# Patient Record
Sex: MALE | Race: WHITE | NOT HISPANIC OR LATINO | Employment: FULL TIME | URBAN - METROPOLITAN AREA
[De-identification: names, ages, dates, MRNs, and addresses within clinical notes are randomized per-mention and may not be internally consistent; named-entity substitution may affect disease eponyms.]

---

## 2019-10-28 ENCOUNTER — APPOINTMENT (OUTPATIENT)
Dept: GENERAL RADIOLOGY | Facility: HOSPITAL | Age: 54
End: 2019-10-28

## 2019-10-28 ENCOUNTER — APPOINTMENT (OUTPATIENT)
Dept: CT IMAGING | Facility: HOSPITAL | Age: 54
End: 2019-10-28

## 2019-10-28 ENCOUNTER — HOSPITAL ENCOUNTER (OUTPATIENT)
Facility: HOSPITAL | Age: 54
Setting detail: OBSERVATION
Discharge: HOME | End: 2019-10-29
Attending: EMERGENCY MEDICINE | Admitting: INTERNAL MEDICINE

## 2019-10-28 DIAGNOSIS — I10 ACCELERATED HYPERTENSION: Primary | ICD-10-CM

## 2019-10-28 DIAGNOSIS — Z86.39 HISTORY OF DIABETES MELLITUS, TYPE II: ICD-10-CM

## 2019-10-28 DIAGNOSIS — E66.9 OBESITY, UNSPECIFIED CLASSIFICATION, UNSPECIFIED OBESITY TYPE, UNSPECIFIED WHETHER SERIOUS COMORBIDITY PRESENT: ICD-10-CM

## 2019-10-28 DIAGNOSIS — R07.89 ATYPICAL CHEST PAIN: ICD-10-CM

## 2019-10-28 LAB
ALBUMIN SERPL-MCNC: 4.6 G/DL (ref 3.5–5.2)
ALBUMIN/GLOB SERPL: 1.5 G/DL
ALP SERPL-CCNC: 70 U/L (ref 39–117)
ALT SERPL W P-5'-P-CCNC: 81 U/L (ref 1–41)
ANION GAP SERPL CALCULATED.3IONS-SCNC: 15 MMOL/L (ref 5–15)
AST SERPL-CCNC: 57 U/L (ref 1–40)
BASOPHILS # BLD AUTO: 0.05 10*3/MM3 (ref 0–0.2)
BASOPHILS NFR BLD AUTO: 0.8 % (ref 0–1.5)
BILIRUB SERPL-MCNC: 0.4 MG/DL (ref 0.2–1.2)
BUN BLD-MCNC: 12 MG/DL (ref 6–20)
BUN/CREAT SERPL: 15.6 (ref 7–25)
CALCIUM SPEC-SCNC: 9.9 MG/DL (ref 8.6–10.5)
CHLORIDE SERPL-SCNC: 99 MMOL/L (ref 98–107)
CO2 SERPL-SCNC: 24 MMOL/L (ref 22–29)
CREAT BLD-MCNC: 0.77 MG/DL (ref 0.76–1.27)
D DIMER PPP FEU-MCNC: 0.35 MCGFEU/ML (ref 0–0.56)
DEPRECATED RDW RBC AUTO: 44.4 FL (ref 37–54)
EOSINOPHIL # BLD AUTO: 0.2 10*3/MM3 (ref 0–0.4)
EOSINOPHIL NFR BLD AUTO: 3.1 % (ref 0.3–6.2)
ERYTHROCYTE [DISTWIDTH] IN BLOOD BY AUTOMATED COUNT: 13.7 % (ref 12.3–15.4)
GFR SERPL CREATININE-BSD FRML MDRD: 106 ML/MIN/1.73
GLOBULIN UR ELPH-MCNC: 3 GM/DL
GLUCOSE BLD-MCNC: 240 MG/DL (ref 65–99)
GLUCOSE BLDC GLUCOMTR-MCNC: 267 MG/DL (ref 70–130)
HCT VFR BLD AUTO: 44.1 % (ref 37.5–51)
HGB BLD-MCNC: 14.2 G/DL (ref 13–17.7)
HOLD SPECIMEN: NORMAL
HOLD SPECIMEN: NORMAL
IMM GRANULOCYTES # BLD AUTO: 0.07 10*3/MM3 (ref 0–0.05)
IMM GRANULOCYTES NFR BLD AUTO: 1.1 % (ref 0–0.5)
LIPASE SERPL-CCNC: 82 U/L (ref 13–60)
LYMPHOCYTES # BLD AUTO: 1.19 10*3/MM3 (ref 0.7–3.1)
LYMPHOCYTES NFR BLD AUTO: 18.3 % (ref 19.6–45.3)
MCH RBC QN AUTO: 28.9 PG (ref 26.6–33)
MCHC RBC AUTO-ENTMCNC: 32.2 G/DL (ref 31.5–35.7)
MCV RBC AUTO: 89.8 FL (ref 79–97)
MONOCYTES # BLD AUTO: 0.51 10*3/MM3 (ref 0.1–0.9)
MONOCYTES NFR BLD AUTO: 7.8 % (ref 5–12)
NEUTROPHILS # BLD AUTO: 4.5 10*3/MM3 (ref 1.7–7)
NEUTROPHILS NFR BLD AUTO: 68.9 % (ref 42.7–76)
NRBC BLD AUTO-RTO: 0 /100 WBC (ref 0–0.2)
NT-PROBNP SERPL-MCNC: 48 PG/ML (ref 5–900)
PLATELET # BLD AUTO: 200 10*3/MM3 (ref 140–450)
PMV BLD AUTO: 10.9 FL (ref 6–12)
POTASSIUM BLD-SCNC: 4.4 MMOL/L (ref 3.5–5.2)
PROT SERPL-MCNC: 7.6 G/DL (ref 6–8.5)
RBC # BLD AUTO: 4.91 10*6/MM3 (ref 4.14–5.8)
SODIUM BLD-SCNC: 138 MMOL/L (ref 136–145)
TROPONIN T SERPL-MCNC: <0.01 NG/ML (ref 0–0.03)
TROPONIN T SERPL-MCNC: <0.01 NG/ML (ref 0–0.03)
WBC NRBC COR # BLD: 6.52 10*3/MM3 (ref 3.4–10.8)
WHOLE BLOOD HOLD SPECIMEN: NORMAL
WHOLE BLOOD HOLD SPECIMEN: NORMAL

## 2019-10-28 PROCEDURE — 85025 COMPLETE CBC W/AUTO DIFF WBC: CPT | Performed by: EMERGENCY MEDICINE

## 2019-10-28 PROCEDURE — 71045 X-RAY EXAM CHEST 1 VIEW: CPT

## 2019-10-28 PROCEDURE — 80053 COMPREHEN METABOLIC PANEL: CPT | Performed by: EMERGENCY MEDICINE

## 2019-10-28 PROCEDURE — 93005 ELECTROCARDIOGRAM TRACING: CPT

## 2019-10-28 PROCEDURE — 99219 PR INITIAL OBSERVATION CARE/DAY 50 MINUTES: CPT | Performed by: INTERNAL MEDICINE

## 2019-10-28 PROCEDURE — 0 IOPAMIDOL PER 1 ML: Performed by: EMERGENCY MEDICINE

## 2019-10-28 PROCEDURE — 82962 GLUCOSE BLOOD TEST: CPT

## 2019-10-28 PROCEDURE — 93005 ELECTROCARDIOGRAM TRACING: CPT | Performed by: EMERGENCY MEDICINE

## 2019-10-28 PROCEDURE — 84484 ASSAY OF TROPONIN QUANT: CPT | Performed by: EMERGENCY MEDICINE

## 2019-10-28 PROCEDURE — 63710000001 INSULIN LISPRO (HUMAN) PER 5 UNITS: Performed by: PHYSICIAN ASSISTANT

## 2019-10-28 PROCEDURE — 83880 ASSAY OF NATRIURETIC PEPTIDE: CPT | Performed by: EMERGENCY MEDICINE

## 2019-10-28 PROCEDURE — 99285 EMERGENCY DEPT VISIT HI MDM: CPT

## 2019-10-28 PROCEDURE — G0378 HOSPITAL OBSERVATION PER HR: HCPCS

## 2019-10-28 PROCEDURE — 83690 ASSAY OF LIPASE: CPT | Performed by: EMERGENCY MEDICINE

## 2019-10-28 PROCEDURE — 85379 FIBRIN DEGRADATION QUANT: CPT | Performed by: NURSE PRACTITIONER

## 2019-10-28 PROCEDURE — 71275 CT ANGIOGRAPHY CHEST: CPT

## 2019-10-28 RX ORDER — CARVEDILOL 12.5 MG/1
12.5 TABLET ORAL 2 TIMES DAILY WITH MEALS
Status: DISCONTINUED | OUTPATIENT
Start: 2019-10-28 | End: 2019-10-29 | Stop reason: HOSPADM

## 2019-10-28 RX ORDER — ASPIRIN 81 MG/1
81 TABLET ORAL DAILY
COMMUNITY

## 2019-10-28 RX ORDER — PAROXETINE 30 MG/1
30 TABLET, FILM COATED ORAL EVERY MORNING
COMMUNITY
End: 2021-03-30

## 2019-10-28 RX ORDER — ATORVASTATIN CALCIUM 20 MG/1
20 TABLET, FILM COATED ORAL NIGHTLY
COMMUNITY
End: 2019-10-29 | Stop reason: HOSPADM

## 2019-10-28 RX ORDER — SODIUM CHLORIDE 0.9 % (FLUSH) 0.9 %
10 SYRINGE (ML) INJECTION EVERY 12 HOURS SCHEDULED
Status: DISCONTINUED | OUTPATIENT
Start: 2019-10-28 | End: 2019-10-29 | Stop reason: HOSPADM

## 2019-10-28 RX ORDER — SODIUM CHLORIDE 0.9 % (FLUSH) 0.9 %
10 SYRINGE (ML) INJECTION AS NEEDED
Status: DISCONTINUED | OUTPATIENT
Start: 2019-10-28 | End: 2019-10-29 | Stop reason: HOSPADM

## 2019-10-28 RX ORDER — LISINOPRIL 10 MG/1
10 TABLET ORAL DAILY
COMMUNITY
End: 2019-10-29 | Stop reason: HOSPADM

## 2019-10-28 RX ORDER — GLYBURIDE-METFORMIN HYDROCHLORIDE 2.5; 5 MG/1; MG/1
2 TABLET ORAL 2 TIMES DAILY WITH MEALS
COMMUNITY

## 2019-10-28 RX ORDER — TAMSULOSIN HYDROCHLORIDE 0.4 MG/1
1 CAPSULE ORAL DAILY
COMMUNITY

## 2019-10-28 RX ORDER — GLYBURIDE-METFORMIN HYDROCHLORIDE 2.5; 5 MG/1; MG/1
TABLET ORAL
COMMUNITY
End: 2019-10-28

## 2019-10-28 RX ORDER — FLUTICASONE PROPIONATE 50 MCG
2 SPRAY, SUSPENSION (ML) NASAL DAILY
COMMUNITY

## 2019-10-28 RX ORDER — SUCRALFATE 1 G/1
1 TABLET ORAL 4 TIMES DAILY
COMMUNITY
End: 2021-03-30

## 2019-10-28 RX ORDER — ASPIRIN 81 MG/1
324 TABLET, CHEWABLE ORAL ONCE
Status: COMPLETED | OUTPATIENT
Start: 2019-10-28 | End: 2019-10-28

## 2019-10-28 RX ORDER — CARVEDILOL 12.5 MG/1
12.5 TABLET ORAL 2 TIMES DAILY WITH MEALS
Status: DISCONTINUED | OUTPATIENT
Start: 2019-10-29 | End: 2019-10-28

## 2019-10-28 RX ORDER — METOPROLOL SUCCINATE 50 MG/1
50 TABLET, EXTENDED RELEASE ORAL DAILY
COMMUNITY
End: 2019-10-29 | Stop reason: HOSPADM

## 2019-10-28 RX ORDER — LISINOPRIL 20 MG/1
20 TABLET ORAL DAILY
Status: DISCONTINUED | OUTPATIENT
Start: 2019-10-29 | End: 2019-10-29 | Stop reason: HOSPADM

## 2019-10-28 RX ORDER — ATORVASTATIN CALCIUM 20 MG/1
20 TABLET, FILM COATED ORAL NIGHTLY
Status: DISCONTINUED | OUTPATIENT
Start: 2019-10-28 | End: 2019-10-29

## 2019-10-28 RX ORDER — LISINOPRIL 10 MG/1
10 TABLET ORAL DAILY
Status: DISCONTINUED | OUTPATIENT
Start: 2019-10-28 | End: 2019-10-28

## 2019-10-28 RX ORDER — ASPIRIN 81 MG/1
81 TABLET ORAL DAILY
Status: DISCONTINUED | OUTPATIENT
Start: 2019-10-29 | End: 2019-10-29 | Stop reason: HOSPADM

## 2019-10-28 RX ORDER — UBIDECARENONE 100 MG
100 CAPSULE ORAL DAILY
COMMUNITY

## 2019-10-28 RX ORDER — PANTOPRAZOLE SODIUM 40 MG/1
40 TABLET, DELAYED RELEASE ORAL DAILY
COMMUNITY

## 2019-10-28 RX ORDER — DEXTROSE MONOHYDRATE 25 G/50ML
25 INJECTION, SOLUTION INTRAVENOUS
Status: DISCONTINUED | OUTPATIENT
Start: 2019-10-28 | End: 2019-10-29 | Stop reason: HOSPADM

## 2019-10-28 RX ORDER — PANTOPRAZOLE SODIUM 40 MG/1
40 TABLET, DELAYED RELEASE ORAL DAILY
Status: DISCONTINUED | OUTPATIENT
Start: 2019-10-28 | End: 2019-10-29 | Stop reason: HOSPADM

## 2019-10-28 RX ORDER — NICOTINE POLACRILEX 4 MG
15 LOZENGE BUCCAL
Status: DISCONTINUED | OUTPATIENT
Start: 2019-10-28 | End: 2019-10-29 | Stop reason: HOSPADM

## 2019-10-28 RX ORDER — FLUOXETINE HYDROCHLORIDE 20 MG/1
30 CAPSULE ORAL DAILY
COMMUNITY
End: 2019-10-28

## 2019-10-28 RX ADMIN — CARVEDILOL 12.5 MG: 12.5 TABLET, FILM COATED ORAL at 23:03

## 2019-10-28 RX ADMIN — LISINOPRIL 10 MG: 10 TABLET ORAL at 22:30

## 2019-10-28 RX ADMIN — IOPAMIDOL 95 ML: 755 INJECTION, SOLUTION INTRAVENOUS at 19:23

## 2019-10-28 RX ADMIN — INSULIN LISPRO 4 UNITS: 100 INJECTION, SOLUTION INTRAVENOUS; SUBCUTANEOUS at 22:26

## 2019-10-28 RX ADMIN — SODIUM CHLORIDE, PRESERVATIVE FREE 10 ML: 5 INJECTION INTRAVENOUS at 22:31

## 2019-10-28 RX ADMIN — ASPIRIN 81 MG 324 MG: 81 TABLET ORAL at 14:59

## 2019-10-28 RX ADMIN — ATORVASTATIN CALCIUM 20 MG: 40 TABLET, FILM COATED ORAL at 22:25

## 2019-10-29 ENCOUNTER — APPOINTMENT (OUTPATIENT)
Dept: CARDIOLOGY | Facility: HOSPITAL | Age: 54
End: 2019-10-29

## 2019-10-29 VITALS
DIASTOLIC BLOOD PRESSURE: 85 MMHG | RESPIRATION RATE: 16 BRPM | HEART RATE: 81 BPM | OXYGEN SATURATION: 94 % | HEIGHT: 72 IN | WEIGHT: 313.05 LBS | TEMPERATURE: 98.3 F | SYSTOLIC BLOOD PRESSURE: 135 MMHG | BODY MASS INDEX: 42.4 KG/M2

## 2019-10-29 LAB
ANION GAP SERPL CALCULATED.3IONS-SCNC: 11 MMOL/L (ref 5–15)
ARTICHOKE IGE QN: 122 MG/DL (ref 0–100)
BH CV ECHO MEAS - AO MAX PG (FULL): 10.5 MMHG
BH CV ECHO MEAS - AO MAX PG: 16.8 MMHG
BH CV ECHO MEAS - AO MEAN PG (FULL): 5 MMHG
BH CV ECHO MEAS - AO MEAN PG: 8.5 MMHG
BH CV ECHO MEAS - AO ROOT AREA (BSA CORRECTED): 1.3
BH CV ECHO MEAS - AO ROOT AREA: 8.4 CM^2
BH CV ECHO MEAS - AO ROOT DIAM: 3.3 CM
BH CV ECHO MEAS - AO V2 MAX: 205 CM/SEC
BH CV ECHO MEAS - AO V2 MEAN: 131.4 CM/SEC
BH CV ECHO MEAS - AO V2 VTI: 39.3 CM
BH CV ECHO MEAS - AVA(I,A): 2.1 CM^2
BH CV ECHO MEAS - AVA(I,D): 2.1 CM^2
BH CV ECHO MEAS - AVA(V,A): 1.8 CM^2
BH CV ECHO MEAS - AVA(V,D): 1.8 CM^2
BH CV ECHO MEAS - BSA(HAYCOCK): 2.7 M^2
BH CV ECHO MEAS - BSA: 2.6 M^2
BH CV ECHO MEAS - BZI_BMI: 42.5 KILOGRAMS/M^2
BH CV ECHO MEAS - BZI_METRIC_HEIGHT: 182.9 CM
BH CV ECHO MEAS - BZI_METRIC_WEIGHT: 142 KG
BH CV ECHO MEAS - EDV(CUBED): 118.4 ML
BH CV ECHO MEAS - EDV(TEICH): 113.4 ML
BH CV ECHO MEAS - EF(CUBED): 74 %
BH CV ECHO MEAS - EF(TEICH): 65.7 %
BH CV ECHO MEAS - ESV(CUBED): 30.8 ML
BH CV ECHO MEAS - ESV(TEICH): 38.9 ML
BH CV ECHO MEAS - FS: 36.2 %
BH CV ECHO MEAS - IVS/LVPW: 1.1
BH CV ECHO MEAS - IVSD: 0.88 CM
BH CV ECHO MEAS - LA DIMENSION: 3.4 CM
BH CV ECHO MEAS - LA/AO: 1.1
BH CV ECHO MEAS - LAD MAJOR: 5.8 CM
BH CV ECHO MEAS - LAT PEAK E' VEL: 15.6 CM/SEC
BH CV ECHO MEAS - LATERAL E/E' RATIO: 6.8
BH CV ECHO MEAS - LV MASS(C)D: 144.8 GRAMS
BH CV ECHO MEAS - LV MASS(C)DI: 56.2 GRAMS/M^2
BH CV ECHO MEAS - LV MAX PG: 6.3 MMHG
BH CV ECHO MEAS - LV MEAN PG: 3.6 MMHG
BH CV ECHO MEAS - LV V1 MAX: 125.6 CM/SEC
BH CV ECHO MEAS - LV V1 MEAN: 88.8 CM/SEC
BH CV ECHO MEAS - LV V1 VTI: 27.5 CM
BH CV ECHO MEAS - LVIDD: 4.9 CM
BH CV ECHO MEAS - LVIDS: 3.1 CM
BH CV ECHO MEAS - LVOT AREA (M): 2.8 CM^2
BH CV ECHO MEAS - LVOT AREA: 3 CM^2
BH CV ECHO MEAS - LVOT DIAM: 1.9 CM
BH CV ECHO MEAS - LVPWD: 0.84 CM
BH CV ECHO MEAS - MED PEAK E' VEL: 5.6 CM/SEC
BH CV ECHO MEAS - MEDIAL E/E' RATIO: 18.8
BH CV ECHO MEAS - MV A MAX VEL: 126.1 CM/SEC
BH CV ECHO MEAS - MV DEC TIME: 0.19 SEC
BH CV ECHO MEAS - MV E MAX VEL: 108 CM/SEC
BH CV ECHO MEAS - MV E/A: 0.86
BH CV ECHO MEAS - PA ACC SLOPE: 752.3 CM/SEC^2
BH CV ECHO MEAS - PA ACC TIME: 0.16 SEC
BH CV ECHO MEAS - PA MAX PG: 4.6 MMHG
BH CV ECHO MEAS - PA MEAN PG: 2.9 MMHG
BH CV ECHO MEAS - PA PR(ACCEL): 6.1 MMHG
BH CV ECHO MEAS - PA V2 MAX: 107.2 CM/SEC
BH CV ECHO MEAS - PA V2 MEAN: 82 CM/SEC
BH CV ECHO MEAS - PA V2 VTI: 26 CM
BH CV ECHO MEAS - SI(AO): 127.5 ML/M^2
BH CV ECHO MEAS - SI(CUBED): 34 ML/M^2
BH CV ECHO MEAS - SI(LVOT): 31.5 ML/M^2
BH CV ECHO MEAS - SI(TEICH): 28.9 ML/M^2
BH CV ECHO MEAS - SV(AO): 328.7 ML
BH CV ECHO MEAS - SV(CUBED): 87.7 ML
BH CV ECHO MEAS - SV(LVOT): 81.3 ML
BH CV ECHO MEAS - SV(TEICH): 74.5 ML
BH CV ECHO MEAS - TAPSE (>1.6): 2.2 CM2
BH CV ECHO MEASUREMENTS AVERAGE E/E' RATIO: 10.19
BH CV STRESS BP STAGE 1: NORMAL
BH CV STRESS BP STAGE 3: NORMAL
BH CV STRESS BP STAGE 4: NORMAL
BH CV STRESS COMMENTS STAGE 1: NORMAL
BH CV STRESS DOSE REGADENOSON STAGE 1: 0.4
BH CV STRESS DURATION MIN STAGE 1: 1
BH CV STRESS DURATION MIN STAGE 2: 1
BH CV STRESS DURATION MIN STAGE 3: 1
BH CV STRESS DURATION MIN STAGE 4: 1
BH CV STRESS DURATION SEC STAGE 1: 0
BH CV STRESS DURATION SEC STAGE 2: 0
BH CV STRESS DURATION SEC STAGE 3: 0
BH CV STRESS DURATION SEC STAGE 4: 0
BH CV STRESS HR STAGE 1: 107
BH CV STRESS HR STAGE 2: 100
BH CV STRESS HR STAGE 3: 97
BH CV STRESS HR STAGE 4: 94
BH CV STRESS O2 STAGE 1: 96
BH CV STRESS O2 STAGE 2: 97
BH CV STRESS O2 STAGE 3: 94
BH CV STRESS O2 STAGE 4: 94
BH CV STRESS PROTOCOL 1: NORMAL
BH CV STRESS RECOVERY BP: NORMAL MMHG
BH CV STRESS RECOVERY HR: 92 BPM
BH CV STRESS RECOVERY O2: 94 %
BH CV STRESS STAGE 1: 1
BH CV STRESS STAGE 2: 2
BH CV STRESS STAGE 3: 3
BH CV STRESS STAGE 4: 4
BH CV VAS BP LEFT ARM: NORMAL MMHG
BH CV XLRA - RV BASE: 3.1 CM
BH CV XLRA - RV LENGTH: 7.3 CM
BH CV XLRA - RV MID: 2.9 CM
BH CV XLRA - TDI S': 13.6 CM/SEC
BUN BLD-MCNC: 12 MG/DL (ref 6–20)
BUN/CREAT SERPL: 16.7 (ref 7–25)
CALCIUM SPEC-SCNC: 9.1 MG/DL (ref 8.6–10.5)
CHLORIDE SERPL-SCNC: 101 MMOL/L (ref 98–107)
CHOLEST SERPL-MCNC: 198 MG/DL (ref 0–200)
CO2 SERPL-SCNC: 28 MMOL/L (ref 22–29)
CREAT BLD-MCNC: 0.72 MG/DL (ref 0.76–1.27)
GFR SERPL CREATININE-BSD FRML MDRD: 114 ML/MIN/1.73
GLUCOSE BLD-MCNC: 270 MG/DL (ref 65–99)
GLUCOSE BLDC GLUCOMTR-MCNC: 262 MG/DL (ref 70–130)
GLUCOSE BLDC GLUCOMTR-MCNC: 293 MG/DL (ref 70–130)
HBA1C MFR BLD: 11.5 % (ref 4.8–5.6)
HDLC SERPL-MCNC: 32 MG/DL (ref 40–60)
LDLC SERPL CALC-MCNC: ABNORMAL MG/DL
LDLC/HDLC SERPL: ABNORMAL {RATIO}
LEFT ATRIUM VOLUME INDEX: 21 ML/M^2
LEFT ATRIUM VOLUME: 54 ML
LV EF NUC BP: 65 %
MAXIMAL PREDICTED HEART RATE: 167 BPM
MAXIMAL PREDICTED HEART RATE: 167 BPM
PERCENT MAX PREDICTED HR: 64.67 %
POTASSIUM BLD-SCNC: 4.2 MMOL/L (ref 3.5–5.2)
SODIUM BLD-SCNC: 140 MMOL/L (ref 136–145)
STRESS BASELINE BP: NORMAL MMHG
STRESS BASELINE HR: 74 BPM
STRESS O2 SAT REST: 93 %
STRESS PERCENT HR: 76 %
STRESS POST ESTIMATED WORKLOAD: 1 METS
STRESS POST EXERCISE DUR MIN: 4 MIN
STRESS POST EXERCISE DUR SEC: 0 SEC
STRESS POST O2 SAT PEAK: 97 %
STRESS POST PEAK BP: NORMAL MMHG
STRESS POST PEAK HR: 108 BPM
STRESS TARGET HR: 142 BPM
STRESS TARGET HR: 142 BPM
TRIGL SERPL-MCNC: 428 MG/DL (ref 0–150)
TROPONIN T SERPL-MCNC: <0.01 NG/ML (ref 0–0.03)
TSH SERPL DL<=0.05 MIU/L-ACNC: 1.75 UIU/ML (ref 0.27–4.2)
VLDLC SERPL-MCNC: ABNORMAL MG/DL

## 2019-10-29 PROCEDURE — G0378 HOSPITAL OBSERVATION PER HR: HCPCS

## 2019-10-29 PROCEDURE — 78492 MYOCRD IMG PET MLT RST&STRS: CPT | Performed by: INTERNAL MEDICINE

## 2019-10-29 PROCEDURE — 93017 CV STRESS TEST TRACING ONLY: CPT

## 2019-10-29 PROCEDURE — 25010000002 REGADENOSON 0.4 MG/5ML SOLUTION: Performed by: INTERNAL MEDICINE

## 2019-10-29 PROCEDURE — 83036 HEMOGLOBIN GLYCOSYLATED A1C: CPT | Performed by: PHYSICIAN ASSISTANT

## 2019-10-29 PROCEDURE — 63710000001 INSULIN LISPRO (HUMAN) PER 5 UNITS: Performed by: PHYSICIAN ASSISTANT

## 2019-10-29 PROCEDURE — 83721 ASSAY OF BLOOD LIPOPROTEIN: CPT | Performed by: PHYSICIAN ASSISTANT

## 2019-10-29 PROCEDURE — 99217 PR OBSERVATION CARE DISCHARGE MANAGEMENT: CPT | Performed by: INTERNAL MEDICINE

## 2019-10-29 PROCEDURE — 84443 ASSAY THYROID STIM HORMONE: CPT | Performed by: PHYSICIAN ASSISTANT

## 2019-10-29 PROCEDURE — 82962 GLUCOSE BLOOD TEST: CPT

## 2019-10-29 PROCEDURE — 93306 TTE W/DOPPLER COMPLETE: CPT

## 2019-10-29 PROCEDURE — 0 RUBIDIUM CHLORIDE: Performed by: INTERNAL MEDICINE

## 2019-10-29 PROCEDURE — 94660 CPAP INITIATION&MGMT: CPT

## 2019-10-29 PROCEDURE — 93306 TTE W/DOPPLER COMPLETE: CPT | Performed by: INTERNAL MEDICINE

## 2019-10-29 PROCEDURE — 78492 MYOCRD IMG PET MLT RST&STRS: CPT

## 2019-10-29 PROCEDURE — 80048 BASIC METABOLIC PNL TOTAL CA: CPT | Performed by: PHYSICIAN ASSISTANT

## 2019-10-29 PROCEDURE — 80061 LIPID PANEL: CPT | Performed by: PHYSICIAN ASSISTANT

## 2019-10-29 PROCEDURE — A9555 RB82 RUBIDIUM: HCPCS | Performed by: INTERNAL MEDICINE

## 2019-10-29 PROCEDURE — 84484 ASSAY OF TROPONIN QUANT: CPT | Performed by: PHYSICIAN ASSISTANT

## 2019-10-29 PROCEDURE — 93018 CV STRESS TEST I&R ONLY: CPT | Performed by: INTERNAL MEDICINE

## 2019-10-29 PROCEDURE — 94799 UNLISTED PULMONARY SVC/PX: CPT

## 2019-10-29 PROCEDURE — 25010000002 SULFUR HEXAFLUORIDE MICROSPH 60.7-25 MG RECONSTITUTED SUSPENSION: Performed by: INTERNAL MEDICINE

## 2019-10-29 RX ORDER — LISINOPRIL 20 MG/1
20 TABLET ORAL DAILY
Qty: 30 TABLET | Refills: 6 | Status: SHIPPED | OUTPATIENT
Start: 2019-10-30 | End: 2019-10-30

## 2019-10-29 RX ORDER — OMEGA-3-ACID ETHYL ESTERS 1 G/1
2 CAPSULE, LIQUID FILLED ORAL 2 TIMES DAILY WITH MEALS
Qty: 120 CAPSULE | Refills: 6 | Status: SHIPPED | OUTPATIENT
Start: 2019-10-29 | End: 2019-10-30 | Stop reason: SDUPTHER

## 2019-10-29 RX ORDER — ATORVASTATIN CALCIUM 80 MG/1
80 TABLET, FILM COATED ORAL NIGHTLY
Qty: 30 TABLET | Refills: 6 | Status: SHIPPED | OUTPATIENT
Start: 2019-10-29 | End: 2019-10-30

## 2019-10-29 RX ORDER — CARVEDILOL 12.5 MG/1
12.5 TABLET ORAL 2 TIMES DAILY WITH MEALS
Qty: 60 TABLET | Refills: 6 | Status: SHIPPED | OUTPATIENT
Start: 2019-10-29 | End: 2019-10-30

## 2019-10-29 RX ORDER — ATORVASTATIN CALCIUM 40 MG/1
80 TABLET, FILM COATED ORAL NIGHTLY
Status: DISCONTINUED | OUTPATIENT
Start: 2019-10-29 | End: 2019-10-29 | Stop reason: HOSPADM

## 2019-10-29 RX ADMIN — FLUOXETINE HYDROCHLORIDE 30 MG: 20 CAPSULE ORAL at 11:07

## 2019-10-29 RX ADMIN — RUBIDIUM CHLORIDE RB-82 1 DOSE: 150 INJECTION, SOLUTION INTRAVENOUS at 09:33

## 2019-10-29 RX ADMIN — PANTOPRAZOLE SODIUM 40 MG: 40 TABLET, DELAYED RELEASE ORAL at 08:10

## 2019-10-29 RX ADMIN — CARVEDILOL 12.5 MG: 12.5 TABLET, FILM COATED ORAL at 08:10

## 2019-10-29 RX ADMIN — LISINOPRIL 20 MG: 20 TABLET ORAL at 08:11

## 2019-10-29 RX ADMIN — INSULIN LISPRO 4 UNITS: 100 INJECTION, SOLUTION INTRAVENOUS; SUBCUTANEOUS at 12:16

## 2019-10-29 RX ADMIN — REGADENOSON 0.4 MG: 0.08 INJECTION, SOLUTION INTRAVENOUS at 09:33

## 2019-10-29 RX ADMIN — ASPIRIN 81 MG: 81 TABLET, COATED ORAL at 08:10

## 2019-10-29 RX ADMIN — RUBIDIUM CHLORIDE RB-82 1 DOSE: 150 INJECTION, SOLUTION INTRAVENOUS at 09:20

## 2019-10-29 RX ADMIN — SULFUR HEXAFLUORIDE 2 ML: KIT at 10:56

## 2019-10-30 RX ORDER — OMEGA-3-ACID ETHYL ESTERS 1 G/1
2 CAPSULE, LIQUID FILLED ORAL 2 TIMES DAILY WITH MEALS
Qty: 120 CAPSULE | Refills: 6 | Status: SHIPPED | OUTPATIENT
Start: 2019-10-30 | End: 2020-08-03 | Stop reason: SDUPTHER

## 2019-10-30 RX ORDER — ATORVASTATIN CALCIUM 80 MG/1
80 TABLET, FILM COATED ORAL NIGHTLY
Qty: 30 TABLET | Refills: 6 | Status: SHIPPED | OUTPATIENT
Start: 2019-10-30 | End: 2020-08-03 | Stop reason: SDUPTHER

## 2019-10-30 RX ORDER — LISINOPRIL 20 MG/1
20 TABLET ORAL DAILY
Qty: 30 TABLET | Refills: 6 | Status: SHIPPED | OUTPATIENT
Start: 2019-10-30 | End: 2020-08-03 | Stop reason: SDUPTHER

## 2019-10-30 RX ORDER — CARVEDILOL 12.5 MG/1
12.5 TABLET ORAL 2 TIMES DAILY WITH MEALS
Qty: 60 TABLET | Refills: 6 | Status: SHIPPED | OUTPATIENT
Start: 2019-10-30 | End: 2020-08-03 | Stop reason: SDUPTHER

## 2020-01-02 ENCOUNTER — CONSULT (OUTPATIENT)
Dept: CARDIOLOGY | Facility: CLINIC | Age: 55
End: 2020-01-02

## 2020-01-02 VITALS
BODY MASS INDEX: 42.66 KG/M2 | DIASTOLIC BLOOD PRESSURE: 82 MMHG | OXYGEN SATURATION: 94 % | HEART RATE: 101 BPM | WEIGHT: 315 LBS | SYSTOLIC BLOOD PRESSURE: 138 MMHG | HEIGHT: 72 IN

## 2020-01-02 DIAGNOSIS — R07.89 CHEST PAIN, ATYPICAL: Primary | ICD-10-CM

## 2020-01-02 PROCEDURE — 93000 ELECTROCARDIOGRAM COMPLETE: CPT | Performed by: PHYSICIAN ASSISTANT

## 2020-01-02 PROCEDURE — 99213 OFFICE O/P EST LOW 20 MIN: CPT | Performed by: PHYSICIAN ASSISTANT

## 2020-01-02 NOTE — PROGRESS NOTES
[facility] at [facility]   OFFICE NOTE      Felix Kaur  1965  PCP: Krystina Marcus MD    SUBJECTIVE:   Felix Kaur is a 54 y.o. male seen for a follow up visit regarding the following:     CC: CHD    HPI:   54-year-old white male presents today for hospital follow-up regarding history of congenital heart disease, hypertension, dyslipidemia, and episode of chest pain.  The patient was admitted to [facility] episode of chest pain that was atypical for angina on 10/19.  He did undergo stress test revealing no evidence of ischemia and echocardiogram normal function.  In addition patient presented with uncontrolled hypertension.  He states since making adjustments in medications he has kept his blood pressure under control 130 systolic range.  He has a known history of congenital heart disease with previous aortic coarctation repair once at  [facility] with [facility].  The CT of the chest during this hospitalization in October revealed no evidence of aneurysm dilatation or any other congenital heart findings.  Patient reports since his visit with hospice overall doing well.  He has had no chest pain chest tightness or shortness of breath.  He looks forward to try to diet lose weight after the holidays.    Cardiac PMH: (Old records have been reviewed and summarized below)  1. Chest pain  a. [facility] ER visit 10/28/19, Negative Markers  b. CTA 10/28/19, No PE, No other significant findings. No Aortic Anueyrsm  c. Negative GXT MPS Normal EF 10/29/19  d. Echocardiogram normal EF 70%, Aortic sclerosus no stenosis  2. HTN  3. HLD  4. DM Type II  5. ETOH use  6. Morbid Obesity  7. SHERMAN, CPAP  8. CHD  a. Coarctation of Aorta repaired  1978  b. Redo Repair Dr. Rogers [facility] 1980      Past Medical History, Past Surgical History, Family history, Social History, and Medications were all reviewed with the patient today and updated as necessary.       Current Outpatient  Medications:   •  aspirin 81 MG EC tablet, Take 81 mg by mouth Daily., Disp: , Rfl:   •  atorvastatin (LIPITOR) 80 MG tablet, Take 1 tablet by mouth Every Night., Disp: 30 tablet, Rfl: 6  •  carvedilol (COREG) 12.5 MG tablet, Take 1 tablet by mouth 2 (Two) Times a Day With Meals., Disp: 60 tablet, Rfl: 6  •  coenzyme Q10 100 MG capsule, Take 100 mg by mouth Daily., Disp: , Rfl:   •  Dapagliflozin Propanediol (FARXIGA) 10 MG tablet, Take 10 mg by mouth Daily., Disp: , Rfl:   •  fluticasone (FLONASE) 50 MCG/ACT nasal spray, 2 sprays into the nostril(s) as directed by provider Daily., Disp: , Rfl:   •  glyBURIDE-metFORMIN (GLUCOVANCE) 2.5-500 MG per tablet, Take 2 tablets by mouth 2 (Two) Times a Day With Meals., Disp: , Rfl:   •  lisinopril (PRINIVIL,ZESTRIL) 20 MG tablet, Take 1 tablet by mouth Daily., Disp: 30 tablet, Rfl: 6  •  Multiple Vitamins-Minerals (MULTIVITAMIN PO), Take 1 tablet by mouth Daily., Disp: , Rfl:   •  omega-3 acid ethyl esters (LOVAZA) 1 g capsule, Take 2 capsules by mouth 2 (Two) Times a Day With Meals., Disp: 120 capsule, Rfl: 6  •  pantoprazole (PROTONIX) 40 MG EC tablet, Take 40 mg by mouth Daily., Disp: , Rfl:   •  PARoxetine (PAXIL) 30 MG tablet, Take 30 mg by mouth Every Morning., Disp: , Rfl:   •  Probiotic Product (PROBIOTIC PO), Take 1 capsule by mouth Daily., Disp: , Rfl:   •  SITagliptin (JANUVIA) 100 MG tablet, Take 100 mg by mouth Daily., Disp: , Rfl:   •  sucralfate (CARAFATE) 1 g tablet, Take 1 g by mouth 4 (Four) Times a Day., Disp: , Rfl:   •  tamsulosin (FLOMAX) 0.4 MG capsule 24 hr capsule, Take 1 capsule by mouth Daily., Disp: , Rfl:       Allergies   Allergen Reactions   • Penicillins Unknown (See Comments)     Childhood reaction     Patient Active Problem List   Diagnosis   • Accelerated hypertension     Past Medical History:   Diagnosis Date   • Acid reflux    • Anxiety    • Diabetes mellitus (CMS/HCC)    • Elevated cholesterol    • GERD (gastroesophageal reflux disease)   "  • History of echocardiogram    • History of left heart catheterization    • History of stress test    • Hypertension    • Sleep apnea      Past Surgical History:   Procedure Laterality Date   • CARDIAC SURGERY      COOARCTATION OF AORTA X 2   • CHOLECYSTECTOMY     • WISDOM TOOTH EXTRACTION       History reviewed. No pertinent family history.  Social History     Tobacco Use   • Smoking status: Former Smoker     Types: Cigarettes   • Smokeless tobacco: Former User     Quit date: 2007   • Tobacco comment: quit 29 years ago   Substance Use Topics   • Alcohol use: Yes     Alcohol/week: 5.0 standard drinks     Types: 5 Shots of liquor per week     Comment: SOCIALLY ON WEEKENDS       ROS:  Review of Symptoms:  General: no recent weight loss/gain, weakness or fatigue  Skin: no rashes, lumps, or other skin changes  HEENT: no dizziness, lightheadedness, or vision changes  Respiratory: no cough or hemoptysis  Cardiovascular: no palpitations, and tachycardia  Gastrointestinal: no black/tarry stools or diarrhea  Urinary: no change in frequency or urgency  Peripheral Vascular: no claudication or leg cramps  Musculoskeletal: no muscle or joint pain/stiffness  Psychiatric: no depression or excessive stress  Neurological: no sensory or motor loss, no syncope  Hematologic: no anemia, easy bruising or bleeding  Endocrine: no thyroid problems, nor heat or cold intolerance    PHYSICAL EXAM:    /82 (BP Location: Left arm, Patient Position: Sitting)   Pulse 101   Ht 182.9 cm (72\")   Wt (!) 146 kg (321 lb)   SpO2 94%   BMI 43.54 kg/m²        Wt Readings from Last 5 Encounters:   01/02/20 (!) 146 kg (321 lb)   10/29/19 (!) 142 kg (313 lb 0.9 oz)   10/08/13 127 kg (281 lb)       BP Readings from Last 5 Encounters:   01/02/20 138/82   10/29/19 135/85   10/08/13 120/72       General appearance - Alert, well appearing, and in no distress   Mental status - Affect appropriate to mood.  Eyes - Sclerae anicteric,  ENMT - Hearing " grossly normal bilaterally, Dental hygiene good.  Neck - Carotids upstroke normal bilaterally, no bruits, no JVD.  Resp - Clear to auscultation, no wheezes, rales or rhonchi, symmetric air entry.  Heart - Normal rate, regular rhythm, normal S1, S2, no murmurs, rubs, clicks or gallops.  GI - Soft, nontender, nondistended, no masses or organomegaly.  Neurological - Grossly intact - normal speech, no focal findings  Musculoskeletal - No joint tenderness, deformity or swelling, no muscular tenderness noted.  Extremities - Peripheral pulses normal, no pedal edema, no clubbing or cyanosis.  Skin - Normal coloration and turgor.  Psych -  oriented to person, place, and time.    Medical problems and test results were reviewed with the patient today.     No results found for this or any previous visit (from the past 672 hour(s)).      EKG: (EKG has been independently visualized by me and summarized below)    ECG 12 Lead  Date/Time: 1/2/2020 10:53 AM  Performed by: Roberto Turner PA  Authorized by: Roberto Turner PA   Comparison: compared with previous ECG from 10/28/2019  Similar to previous ECG  Rhythm: sinus rhythm  Rate: normal  Conduction: conduction normal  ST Segments: ST segments normal  QRS axis: normal  Other: no other findings    Clinical impression: normal ECG            ASSESSMENT   1. Atypical Chest pain, Resolved-Negative MPS 10/19  2. HTN: Controlled on Coreg, Zestril   3. HLD,  10/29/19-Lipitor 80mg daily, Lovaza   4. Morbid Obesity: discussed need for diet and exercise.   5. DM Type II, Diet and exercise,   6. SHERMAN-CPAP  7. CHD: Remote Coarctation Repair x 2.       PLAN  · Continue diet, exercise, reduce sodium, and weight loss. Close   monitoring of blood pressure.  · Return for follow-up in 1 year with Dr. Stallings or sooner as needed.      1/2/2020  10:37 AM    Will Johnny COATS

## 2020-08-03 DIAGNOSIS — E78.5 HYPERLIPIDEMIA, UNSPECIFIED HYPERLIPIDEMIA TYPE: Primary | ICD-10-CM

## 2020-08-03 RX ORDER — ATORVASTATIN CALCIUM 80 MG/1
80 TABLET, FILM COATED ORAL NIGHTLY
Qty: 30 TABLET | Refills: 6 | Status: SHIPPED | OUTPATIENT
Start: 2020-08-03 | End: 2021-08-13 | Stop reason: SDUPTHER

## 2020-08-03 RX ORDER — LISINOPRIL 20 MG/1
20 TABLET ORAL DAILY
Qty: 30 TABLET | Refills: 6 | Status: SHIPPED | OUTPATIENT
Start: 2020-08-03 | End: 2021-03-02 | Stop reason: SDUPTHER

## 2020-08-03 RX ORDER — OMEGA-3-ACID ETHYL ESTERS 1 G/1
2 CAPSULE, LIQUID FILLED ORAL 2 TIMES DAILY WITH MEALS
Qty: 120 CAPSULE | Refills: 6 | Status: SHIPPED | OUTPATIENT
Start: 2020-08-03 | End: 2022-04-12 | Stop reason: ALTCHOICE

## 2020-08-03 RX ORDER — CARVEDILOL 12.5 MG/1
12.5 TABLET ORAL 2 TIMES DAILY WITH MEALS
Qty: 60 TABLET | Refills: 6 | Status: SHIPPED | OUTPATIENT
Start: 2020-08-03 | End: 2020-11-20 | Stop reason: SDUPTHER

## 2020-11-20 RX ORDER — CARVEDILOL 12.5 MG/1
12.5 TABLET ORAL 2 TIMES DAILY WITH MEALS
Qty: 60 TABLET | Refills: 11 | Status: SHIPPED | OUTPATIENT
Start: 2020-11-20 | End: 2021-07-06 | Stop reason: SDUPTHER

## 2020-11-20 NOTE — TELEPHONE ENCOUNTER
Medication Refill Request    Medication: Carvedilol 12.5 mg BID    Pertinent Labs:  Lab Results   Component Value Date    GLUCOSE 270 (H) 10/29/2019    BUN 12 10/29/2019    CREATININE 0.72 (L) 10/29/2019    EGFRIFNONA 114 10/29/2019    BCR 16.7 10/29/2019    K 4.2 10/29/2019    CO2 28.0 10/29/2019    CALCIUM 9.1 10/29/2019    ALBUMIN 4.60 10/28/2019    ALKPHOS 70 10/28/2019    AST 57 (H) 10/28/2019    ALT 81 (H) 10/28/2019      Lab Results   Component Value Date    CHOL 198 10/29/2019    TRIG 428 (H) 10/29/2019    HDL 32 (L) 10/29/2019    LDL  10/29/2019      Comment:      Unable to calculate     (H) 10/29/2019     Lab Results   Component Value Date    HGBA1C 11.50 (H) 10/29/2019     Lab Results   Component Value Date    WBC 6.52 10/28/2019    HGB 14.2 10/28/2019    HCT 44.1 10/28/2019    MCV 89.8 10/28/2019     10/28/2019     Lab Results   Component Value Date    TSH 1.750 10/29/2019

## 2021-02-01 ENCOUNTER — PRIOR AUTHORIZATION (OUTPATIENT)
Dept: CARDIOLOGY | Facility: CLINIC | Age: 56
End: 2021-02-01

## 2021-02-01 NOTE — TELEPHONE ENCOUNTER
Prior Auth for Vascepa 2g BID    Status: Approved  02/01/2021-02/01/2022      Key: [id number] - PA Case ID: [id number] - Rx #: [id number]

## 2021-02-08 ENCOUNTER — TELEPHONE (OUTPATIENT)
Dept: CARDIOLOGY | Facility: CLINIC | Age: 56
End: 2021-02-08

## 2021-02-08 NOTE — TELEPHONE ENCOUNTER
Called patient to let them know results and recommendations per NSK (see NSK note).   Left voicemail

## 2021-02-08 NOTE — TELEPHONE ENCOUNTER
----- Message from Genaro Shultz MD sent at 2/5/2021  4:24 PM EST -----  Received patient's lipid panel.  Cholesterol is in good range.  Triglycerides are still elevated.  He is taking Lovaza.  Continue to watch diet and get 30 minutes of exercise at least 3 days a week.  When he comes for follow-up in spring can discuss initiating Vascepa with him.

## 2021-03-02 RX ORDER — LISINOPRIL 20 MG/1
20 TABLET ORAL DAILY
Qty: 30 TABLET | Refills: 11 | Status: SHIPPED | OUTPATIENT
Start: 2021-03-02 | End: 2022-03-02

## 2021-03-02 NOTE — TELEPHONE ENCOUNTER
Medication Refill Request    Medication:  - lisinopril (PRINIVIL,ZESTRIL) 20 MG tablet daily    Pertinent Labs:  Lab Results   Component Value Date    GLUCOSE 270 (H) 10/29/2019    BUN 12 10/29/2019    CREATININE 0.72 (L) 10/29/2019    EGFRIFNONA 114 10/29/2019    BCR 16.7 10/29/2019    K 4.2 10/29/2019    CO2 28.0 10/29/2019    CALCIUM 9.1 10/29/2019    ALBUMIN 4.60 10/28/2019    ALKPHOS 70 10/28/2019    AST 57 (H) 10/28/2019    ALT 81 (H) 10/28/2019      Lab Results   Component Value Date    CHOL 198 10/29/2019    TRIG 428 (H) 10/29/2019    HDL 32 (L) 10/29/2019    LDL  10/29/2019      Comment:      Unable to calculate     (H) 10/29/2019     Lab Results   Component Value Date    HGBA1C 11.50 (H) 10/29/2019     Lab Results   Component Value Date    WBC 6.52 10/28/2019    HGB 14.2 10/28/2019    HCT 44.1 10/28/2019    MCV 89.8 10/28/2019     10/28/2019     Lab Results   Component Value Date    TSH 1.750 10/29/2019

## 2021-03-29 PROBLEM — E78.5 HYPERLIPIDEMIA: Status: ACTIVE | Noted: 2021-03-29

## 2021-03-29 PROBLEM — E11.9 TYPE 2 DIABETES MELLITUS (HCC): Status: ACTIVE | Noted: 2021-03-29

## 2021-03-29 PROBLEM — Q24.9 CONGENITAL HEART DISEASE: Status: ACTIVE | Noted: 2021-03-29

## 2021-03-29 PROBLEM — G47.33 OBSTRUCTIVE SLEEP APNEA: Status: ACTIVE | Noted: 2021-03-29

## 2021-03-29 PROBLEM — E66.01 MORBID OBESITY: Status: ACTIVE | Noted: 2021-03-29

## 2021-03-29 PROBLEM — R07.9 CHEST PAIN: Status: ACTIVE | Noted: 2021-03-29

## 2021-03-29 PROBLEM — F10.10 ETOH ABUSE: Status: ACTIVE | Noted: 2021-03-29

## 2021-03-29 NOTE — PROGRESS NOTES
[facility]  Follow Up Visit  Felix Kaur  1965    VISIT DATE:  03/30/21    PCP:   Krystina Marcus MD  [id number] [facility] [id number]          CC:  Hypertension and Congenital Heart Defect      Problem List:  1. Chest Pain  a. [facility] ER visit 10/28/19, Negative Markers  b. CTA 10/28/19, No PE, No other significant findings. No Aortic Anueyrsm  c. Negative GXT MPS Normal EF 10/29/19  d. Echocardiogram normal EF 70%, Aortic sclerosus no stenosis  2. Hypertension  3. Hyperlipidemia  4. Type 2 Diabetes Mellitus  5. ETOH Abuse  6. Morbid Obesity  7. Obstructive Sleep Apnea (CPAP)  8. Congential Heart Disease   - Coarctation of Aorta Repaired [facility] 1978   - Redo Repair Dr. Rogers [facility] 1980  9.  COVID-19 infection September 2020      Cardiac Testing:    Echo Complete w/Doppler and Color Flow 10/29/2019  · Left ventricular systolic function is normal. Estimated EF appears to be in the range of 66 - 70%  · All left ventricular wall segments contract normally.  · There is aortic sclerosis without any hemodynamically significant stenosis  · Other cardiac valves are structurally and functionally normal    Stress Test With Pet Myocardial Perfusion (MULTI STUDY, REST AND STRESS) 10/29/2019  · Patient denied chest pain  · Patient developed isolated T wave inversion in aVL, there was no diagnostic ischemic ST segment depression  · Myocardial perfusion imaging indicates a normal myocardial perfusion study with no evidence of ischemia.  · There is mild apical thinning present, a normal variant  · No TID, ratio 0.98  · Rest EF = 59% Stress EF = 65%. There is normal wall motion including the apex  · There is no significant coronary artery calcification present  · Impressions are consistent with a low risk study    CT Angiogram Chest with and without Contrast 10/28/2019  1. No evidence of pulmonary embolus on this study.  2. No acute radiographic abnormality is demonstrated.  3. Evidence of prior  aortic surgery for coarctation. No aortic complications demonstrated.    History of Present Illness:  Felix Kaur  Is a 55 y.o. male with pertinent cardiac history detailed above.  Patient has been doing fairly well.  He did have COVID-19 in September and he states ever since he has had lingering dyspnea on exertion.  He had D-dimer and chest x-ray checked with Dr. Marcus and those returned within normal limits.  He is not having any chest pain.  His blood pressure is controlled.  His diabetes is not controlled he is following back up with endocrinology for this..  No other concerns today.      Patient Active Problem List    Diagnosis Date Noted   • Chest pain 03/29/2021   • Hyperlipidemia 03/29/2021   • Type 2 diabetes mellitus (CMS/HCC) 03/29/2021   • ETOH abuse 03/29/2021   • Morbid obesity (CMS/[facility]) 03/29/2021   • Obstructive sleep apnea 03/29/2021   • Congenital heart disease 03/29/2021     Note Last Updated: 3/29/2021     - Coarctation of Aorta Repaired @  1978  - Redo Repair by Dr. Rogers @ [facility] 1980     • Accelerated hypertension 10/28/2019       Allergies   Allergen Reactions   • Penicillins Unknown (See Comments)     Childhood reaction       Social History     Socioeconomic History   • Marital status:      Spouse name: Not on file   • Number of children: Not on file   • Years of education: Not on file   • Highest education level: Not on file   Tobacco Use   • Smoking status: Former Smoker     Types: Cigarettes   • Smokeless tobacco: Former User     Quit date: 2007   • Tobacco comment: quit 29 years ago   Vaping Use   • Vaping Use: Never used   Substance and Sexual Activity   • Alcohol use: Yes     Alcohol/week: 5.0 standard drinks     Types: 5 Shots of liquor per week     Comment: SOCIALLY ON WEEKENDS   • Drug use: No   • Sexual activity: Defer       History reviewed. No pertinent family history.    Current Medications:    Current Outpatient Medications:   •  aspirin 81 MG EC tablet, Take 81  mg by mouth Daily., Disp: , Rfl:   •  atorvastatin (LIPITOR) 80 MG tablet, Take 1 tablet by mouth Every Night., Disp: 30 tablet, Rfl: 6  •  carvedilol (COREG) 12.5 MG tablet, Take 1 tablet by mouth 2 (Two) Times a Day With Meals., Disp: 60 tablet, Rfl: 11  •  coenzyme Q10 100 MG capsule, Take 100 mg by mouth Daily., Disp: , Rfl:   •  Dapagliflozin Propanediol (FARXIGA) 10 MG tablet, Take 10 mg by mouth Daily., Disp: , Rfl:   •  fluticasone (FLONASE) 50 MCG/ACT nasal spray, 2 sprays into the nostril(s) as directed by provider Daily., Disp: , Rfl:   •  glyBURIDE-metFORMIN (GLUCOVANCE) 2.5-500 MG per tablet, Take 2 tablets by mouth 2 (Two) Times a Day With Meals., Disp: , Rfl:   •  lisinopril (PRINIVIL,ZESTRIL) 20 MG tablet, Take 1 tablet by mouth Daily., Disp: 30 tablet, Rfl: 11  •  Multiple Vitamins-Minerals (MULTIVITAMIN PO), Take 1 tablet by mouth Daily., Disp: , Rfl:   •  omega-3 acid ethyl esters (LOVAZA) 1 g capsule, Take 2 capsules by mouth 2 (Two) Times a Day With Meals., Disp: 120 capsule, Rfl: 6  •  pantoprazole (PROTONIX) 40 MG EC tablet, Take 40 mg by mouth Daily., Disp: , Rfl:   •  Probiotic Product (PROBIOTIC PO), Take 1 capsule by mouth Daily., Disp: , Rfl:   •  tamsulosin (FLOMAX) 0.4 MG capsule 24 hr capsule, Take 1 capsule by mouth Daily., Disp: , Rfl:   •  BD Pen Needle Monica 2nd Gen 32G X 4 MM misc, See Admin Instructions., Disp: , Rfl:   •  glucose blood test strip, Accu-Chek Clarisa Plus Meter  use as directed E11.65, Disp: , Rfl:   •  glucose blood test strip, Accu-Chek Clarisa Plus Meter  use as directed E11.65, Disp: , Rfl:   •  glucose blood test strip, OneTouch Ultra Blue Test Strip  Check blood sugar daily (E11.65), Disp: , Rfl:   •  Insulin Glargine (BASAGLAR KWIKPEN) 100 UNIT/ML injection pen, Inject 26 Units under the skin into the appropriate area as directed Every Night., Disp: , Rfl:   •  Ozempic, 0.25 or 0.5 MG/DOSE, 2 MG/1.5ML solution pen-injector, INJECT 0.5MG SUBCUTANEOUSLY ONE DAY A  "WEEK, Disp: , Rfl:      Review of Systems   Cardiovascular: Positive for dyspnea on exertion. Negative for chest pain, irregular heartbeat, leg swelling, near-syncope, palpitations and syncope.   Respiratory: Negative for cough and shortness of breath.    All other systems reviewed and are negative.      Vitals:    03/30/21 0929   BP: 132/78   Pulse: 92   SpO2: 93%   Weight: (!) 143 kg (315 lb)   Height: 182.9 cm (72.01\")       Physical Exam  Constitutional:       Appearance: Normal appearance.   Neck:      Vascular: No carotid bruit.   Cardiovascular:      Rate and Rhythm: Normal rate and regular rhythm.      Pulses: Normal pulses.      Heart sounds: Normal heart sounds.   Pulmonary:      Effort: Pulmonary effort is normal.      Breath sounds: Normal breath sounds.   Abdominal:      Palpations: Abdomen is soft.   Musculoskeletal:      Cervical back: Neck supple.      Right lower leg: No edema.      Left lower leg: No edema.   Neurological:      General: No focal deficit present.      Mental Status: He is alert. Mental status is at baseline.   Psychiatric:         Mood and Affect: Mood normal.         Diagnostic Data:  Procedures  Lab Results   Component Value Date    TRIG 428 (H) 10/29/2019    HDL 32 (L) 10/29/2019     Lab Results   Component Value Date    GLUCOSE 270 (H) 10/29/2019    BUN 12 10/29/2019    CREATININE 0.72 (L) 10/29/2019     10/29/2019    K 4.2 10/29/2019     10/29/2019    CO2 28.0 10/29/2019     Lab Results   Component Value Date    HGBA1C 11.50 (H) 10/29/2019     Lab Results   Component Value Date    WBC 6.52 10/28/2019    HGB 14.2 10/28/2019    HCT 44.1 10/28/2019     10/28/2019       Assessment:   Diagnosis Plan   1. Dyspnea on exertion  Adult Transthoracic Echo Complete W/ Cont if Necessary Per Protocol       Plan:        1. History of aortic coarctation repair  -CT 2019 showed no evidence of abnormalities    2. Hypertension  -controlled on coreg, lisinopril    3. " Hyperlipidemia  -cholesterol 134, , LDL 44, HDL 32  -CMP normal  -on atorvastatin, Lovaza for triglycerides  -Discussed that improved control of diabetes will result in improvement of triglycerides    4. Type 2 diabetes  A1c 11.3  -on farxiga and ozempic for reduction of CV risk  -follows with Endocrine    5. SHERMAN  -Has CPAP    6.  Dyspnea on exertion s/p Covid  -negative D-dimer  -reports CXR was normal   -will check echo to look for any post Covid cardiac abnormalities    Follow-up in 6 months or sooner as needed

## 2021-03-30 ENCOUNTER — OFFICE VISIT (OUTPATIENT)
Dept: CARDIOLOGY | Facility: CLINIC | Age: 56
End: 2021-03-30

## 2021-03-30 VITALS
SYSTOLIC BLOOD PRESSURE: 132 MMHG | DIASTOLIC BLOOD PRESSURE: 78 MMHG | OXYGEN SATURATION: 93 % | HEIGHT: 72 IN | BODY MASS INDEX: 42.66 KG/M2 | WEIGHT: 315 LBS | HEART RATE: 92 BPM

## 2021-03-30 DIAGNOSIS — R06.09 DYSPNEA ON EXERTION: Primary | ICD-10-CM

## 2021-03-30 PROCEDURE — 99214 OFFICE O/P EST MOD 30 MIN: CPT | Performed by: INTERNAL MEDICINE

## 2021-03-30 RX ORDER — SEMAGLUTIDE 1.34 MG/ML
0.5 INJECTION, SOLUTION SUBCUTANEOUS
COMMUNITY
End: 2021-03-30

## 2021-03-30 RX ORDER — SEMAGLUTIDE 1.34 MG/ML
INJECTION, SOLUTION SUBCUTANEOUS
COMMUNITY
Start: 2021-02-06

## 2021-03-30 RX ORDER — INSULIN GLARGINE 100 [IU]/ML
26 INJECTION, SOLUTION SUBCUTANEOUS NIGHTLY
COMMUNITY

## 2021-03-30 RX ORDER — INSULIN DEGLUDEC INJECTION 100 U/ML
INJECTION, SOLUTION SUBCUTANEOUS
COMMUNITY
End: 2021-03-30

## 2021-03-30 RX ORDER — PEN NEEDLE, DIABETIC 32GX 5/32"
NEEDLE, DISPOSABLE MISCELLANEOUS SEE ADMIN INSTRUCTIONS
COMMUNITY
Start: 2021-01-25

## 2021-05-19 ENCOUNTER — APPOINTMENT (OUTPATIENT)
Dept: CARDIOLOGY | Facility: HOSPITAL | Age: 56
End: 2021-05-19

## 2021-07-06 DIAGNOSIS — E78.5 HYPERLIPIDEMIA, UNSPECIFIED HYPERLIPIDEMIA TYPE: Primary | ICD-10-CM

## 2021-07-06 RX ORDER — CARVEDILOL 12.5 MG/1
12.5 TABLET ORAL 2 TIMES DAILY WITH MEALS
Qty: 60 TABLET | Refills: 11 | Status: SHIPPED | OUTPATIENT
Start: 2021-07-06 | End: 2022-04-12 | Stop reason: SDUPTHER

## 2021-07-06 NOTE — TELEPHONE ENCOUNTER
Medication Refill Request    Medication: carvedilol (COREG) 12.5 MG tablet BID  Will order updated labs      Pertinent Labs:  Lab Results   Component Value Date    GLUCOSE 270 (H) 10/29/2019    BUN 12 10/29/2019    CREATININE 0.72 (L) 10/29/2019    EGFRIFNONA 114 10/29/2019    BCR 16.7 10/29/2019    K 4.2 10/29/2019    CO2 28.0 10/29/2019    CALCIUM 9.1 10/29/2019    ALBUMIN 4.60 10/28/2019    ALKPHOS 70 10/28/2019    AST 57 (H) 10/28/2019    ALT 81 (H) 10/28/2019      Lab Results   Component Value Date    CHOL 198 10/29/2019    TRIG 428 (H) 10/29/2019    HDL 32 (L) 10/29/2019    LDL  10/29/2019      Comment:      Unable to calculate     (H) 10/29/2019     Lab Results   Component Value Date    HGBA1C 11.50 (H) 10/29/2019     Lab Results   Component Value Date    WBC 6.52 10/28/2019    HGB 14.2 10/28/2019    HCT 44.1 10/28/2019    MCV 89.8 10/28/2019     10/28/2019     Lab Results   Component Value Date    TSH 1.750 10/29/2019

## 2021-08-13 RX ORDER — ATORVASTATIN CALCIUM 80 MG/1
80 TABLET, FILM COATED ORAL NIGHTLY
Qty: 90 TABLET | Refills: 3 | Status: SHIPPED | OUTPATIENT
Start: 2021-08-13

## 2022-03-02 RX ORDER — LISINOPRIL 20 MG/1
20 TABLET ORAL DAILY
Qty: 30 TABLET | Refills: 0 | Status: SHIPPED | OUTPATIENT
Start: 2022-03-02 | End: 2022-06-30 | Stop reason: SDUPTHER

## 2022-03-16 PROCEDURE — U0004 COV-19 TEST NON-CDC HGH THRU: HCPCS | Performed by: FAMILY MEDICINE

## 2022-03-17 ENCOUNTER — TELEPHONE (OUTPATIENT)
Dept: URGENT CARE | Facility: CLINIC | Age: 57
End: 2022-03-17

## 2022-04-12 ENCOUNTER — OFFICE VISIT (OUTPATIENT)
Dept: CARDIOLOGY | Facility: CLINIC | Age: 57
End: 2022-04-12

## 2022-04-12 VITALS
BODY MASS INDEX: 41.99 KG/M2 | HEIGHT: 72 IN | WEIGHT: 310 LBS | OXYGEN SATURATION: 95 % | HEART RATE: 82 BPM | SYSTOLIC BLOOD PRESSURE: 140 MMHG | DIASTOLIC BLOOD PRESSURE: 86 MMHG

## 2022-04-12 DIAGNOSIS — I10 ACCELERATED HYPERTENSION: Primary | ICD-10-CM

## 2022-04-12 PROCEDURE — 99214 OFFICE O/P EST MOD 30 MIN: CPT | Performed by: INTERNAL MEDICINE

## 2022-04-12 RX ORDER — ICOSAPENT ETHYL 1000 MG/1
2 CAPSULE ORAL 2 TIMES DAILY WITH MEALS
Qty: 120 CAPSULE | Refills: 6 | Status: SHIPPED | OUTPATIENT
Start: 2022-04-12

## 2022-04-12 RX ORDER — INSULIN ASPART 100 [IU]/ML
INJECTION, SUSPENSION SUBCUTANEOUS EVERY 12 HOURS SCHEDULED
COMMUNITY

## 2022-04-12 RX ORDER — BLOOD-GLUCOSE METER
EACH MISCELLANEOUS SEE ADMIN INSTRUCTIONS
COMMUNITY
Start: 2022-03-01

## 2022-04-12 RX ORDER — INSULIN DETEMIR 100 [IU]/ML
INJECTION, SOLUTION SUBCUTANEOUS
COMMUNITY
Start: 2022-01-18

## 2022-04-12 RX ORDER — DULAGLUTIDE 1.5 MG/.5ML
INJECTION, SOLUTION SUBCUTANEOUS
COMMUNITY
Start: 2022-03-27

## 2022-04-12 RX ORDER — INSULIN LISPRO 100 [IU]/ML
INJECTION, SUSPENSION SUBCUTANEOUS
COMMUNITY
Start: 2022-04-08

## 2022-04-12 RX ORDER — CARVEDILOL 25 MG/1
25 TABLET ORAL 2 TIMES DAILY WITH MEALS
Qty: 60 TABLET | Refills: 6 | Status: SHIPPED | OUTPATIENT
Start: 2022-04-12 | End: 2022-08-03 | Stop reason: SDUPTHER

## 2022-04-12 NOTE — PROGRESS NOTES
[facility] Cardiology  Follow Up Visit  Felix Kaur  1965    VISIT DATE:  04/12/22    PCP:   Krystina Marcus MD  [address] [facility] [address]          CC:  No chief complaint on file.      Problem List:  1. Chest Pain  a. [facility] ER visit 10/28/19, Negative Markers  b. CTA 10/28/19, No PE, No other significant findings. No Aortic Anueyrsm  c. Negative GXT MPS Normal EF 10/29/19  d. Echocardiogram normal EF 70%, Aortic sclerosus no stenosis  2. Hypertension  3. Hyperlipidemia  4. Type 2 Diabetes Mellitus  5. ETOH Abuse  6. Morbid Obesity  7. Obstructive Sleep Apnea (CPAP)  8. Congential Heart Disease              - Coarctation of Aorta Repaired [facility] 1978              - Redo Repair Dr. Rogres [facility] 1982  9.  COVID-19 infection September 2020        Cardiac Testing:     Echo Complete w/Doppler and Color Flow 10/29/2019  · Left ventricular systolic function is normal. Estimated EF appears to be in the range of 66 - 70%  · All left ventricular wall segments contract normally.  · There is aortic sclerosis without any hemodynamically significant stenosis  · Other cardiac valves are structurally and functionally normal     Stress Test With Pet Myocardial Perfusion (MULTI STUDY, REST AND STRESS) 10/29/2019  · Patient denied chest pain  · Patient developed isolated T wave inversion in aVL, there was no diagnostic ischemic ST segment depression  · Myocardial perfusion imaging indicates a normal myocardial perfusion study with no evidence of ischemia.  · There is mild apical thinning present, a normal variant  · No TID, ratio 0.98  · Rest EF = 59% Stress EF = 65%. There is normal wall motion including the apex  · There is no significant coronary artery calcification present  · Impressions are consistent with a low risk study     CT Angiogram Chest with and without Contrast 10/28/2019  1. No evidence of pulmonary embolus on this study.  2. No acute radiographic abnormality is demonstrated.  3.  Evidence of prior aortic surgery for coarctation. No aortic complications demonstrated.    History of Present Illness:  eFlix Kaur  Is a 56 y.o. male with pertinent cardiac history detailed above.  No cardiac complaints.  Doing well.  Denies chest pain.  Blood pressure mildly elevated.  Diabetes does require improved control.  Triglycerides also need improved control.  After last visit echo was planned to evaluate for dyspnea post Covid but his co-pay was high and the dyspnea resolved as he got further out from infection so he did not pursue this.  He has no other complaints today.  He is following with his endocrinologist for improved diabetic control.      Patient Active Problem List    Diagnosis Date Noted   • Chest pain 03/29/2021   • Hyperlipidemia 03/29/2021   • Type 2 diabetes mellitus (HCC) 03/29/2021   • ETOH abuse 03/29/2021   • Morbid obesity (HCC) 03/29/2021   • Obstructive sleep apnea 03/29/2021   • Congenital heart disease 03/29/2021     Note Last Updated: 3/29/2021     - Coarctation of Aorta Repaired @  1978  - Redo Repair by Dr. Rogers @ [facility] 1980     • Accelerated hypertension 10/28/2019       Allergies   Allergen Reactions   • Penicillins Unknown (See Comments)     Childhood reaction       Social History     Socioeconomic History   • Marital status:    Tobacco Use   • Smoking status: Former Smoker     Types: Cigarettes   • Smokeless tobacco: Former User     Quit date: 2007   • Tobacco comment: quit 29 years ago   Vaping Use   • Vaping Use: Never used   Substance and Sexual Activity   • Alcohol use: Yes     Alcohol/week: 5.0 standard drinks     Types: 5 Shots of liquor per week     Comment: SOCIALLY ON WEEKENDS   • Drug use: No   • Sexual activity: Defer       No family history on file.    Current Medications:    Current Outpatient Medications:   •  aspirin 81 MG EC tablet, Take 81 mg by mouth Daily., Disp: , Rfl:   •  atorvastatin (LIPITOR) 80 MG tablet, Take 1 tablet by mouth Every  Night., Disp: 90 tablet, Rfl: 3  •  BD Pen Needle Monica 2nd Gen 32G X 4 MM misc, See Admin Instructions., Disp: , Rfl:   •  brompheniramine-pseudoephedrine-DM 30-2-10 MG/5ML syrup, Take 5 mL by mouth 4 (Four) Times a Day As Needed for Congestion, Cough or Allergies., Disp: 118 mL, Rfl: 0  •  carvedilol (COREG) 12.5 MG tablet, Take 1 tablet by mouth 2 (Two) Times a Day With Meals., Disp: 60 tablet, Rfl: 11  •  coenzyme Q10 100 MG capsule, Take 100 mg by mouth Daily., Disp: , Rfl:   •  Dapagliflozin Propanediol (FARXIGA) 10 MG tablet, Take 10 mg by mouth Daily., Disp: , Rfl:   •  fluticasone (FLONASE) 50 MCG/ACT nasal spray, 2 sprays into the nostril(s) as directed by provider Daily., Disp: , Rfl:   •  glucose blood test strip, Accu-Chek Clarisa Plus Meter  use as directed E11.65, Disp: , Rfl:   •  glucose blood test strip, Accu-Chek Clarisa Plus Meter  use as directed E11.65, Disp: , Rfl:   •  glucose blood test strip, OneTouch Ultra Blue Test Strip  Check blood sugar daily (E11.65), Disp: , Rfl:   •  glyBURIDE-metFORMIN (GLUCOVANCE) 2.5-500 MG per tablet, Take 2 tablets by mouth 2 (Two) Times a Day With Meals., Disp: , Rfl:   •  Insulin Glargine (BASAGLAR KWIKPEN) 100 UNIT/ML injection pen, Inject 26 Units under the skin into the appropriate area as directed Every Night., Disp: , Rfl:   •  levoFLOXacin (LEVAQUIN) 500 MG tablet, Take 1 tablet by mouth Daily., Disp: 6 tablet, Rfl: 0  •  lisinopril (PRINIVIL,ZESTRIL) 20 MG tablet, Take 1 tablet by mouth Daily. *NEEDS UPDATED LAB WORK FOR FUTURE REFILLS*, Disp: 30 tablet, Rfl: 0  •  Multiple Vitamins-Minerals (MULTIVITAMIN PO), Take 1 tablet by mouth Daily., Disp: , Rfl:   •  omega-3 acid ethyl esters (LOVAZA) 1 g capsule, Take 2 capsules by mouth 2 (Two) Times a Day With Meals., Disp: 120 capsule, Rfl: 6  •  Ozempic, 0.25 or 0.5 MG/DOSE, 2 MG/1.5ML solution pen-injector, INJECT 0.5MG SUBCUTANEOUSLY ONE DAY A WEEK, Disp: , Rfl:   •  pantoprazole (PROTONIX) 40 MG EC tablet,  Take 40 mg by mouth Daily., Disp: , Rfl:   •  Probiotic Product (PROBIOTIC PO), Take 1 capsule by mouth Daily., Disp: , Rfl:   •  tamsulosin (FLOMAX) 0.4 MG capsule 24 hr capsule, Take 1 capsule by mouth Daily., Disp: , Rfl:      Review of Systems   Cardiovascular: Negative for chest pain, dyspnea on exertion, irregular heartbeat, leg swelling, near-syncope and palpitations.   Respiratory: Negative for shortness of breath.        There were no vitals filed for this visit.    Physical Exam  Constitutional:       Appearance: Normal appearance.   Neck:      Vascular: No carotid bruit.   Cardiovascular:      Rate and Rhythm: Normal rate and regular rhythm.      Pulses: Normal pulses.      Heart sounds: Normal heart sounds.   Pulmonary:      Breath sounds: Normal breath sounds.   Musculoskeletal:      Right lower leg: No edema.      Left lower leg: No edema.   Neurological:      General: No focal deficit present.      Mental Status: He is alert and oriented to person, place, and time.         Diagnostic Data:  Procedures  Lab Results   Component Value Date    TRIG 428 (H) 10/29/2019    HDL 32 (L) 10/29/2019     Lab Results   Component Value Date    GLUCOSE 270 (H) 10/29/2019    BUN 12 10/29/2019    CREATININE 0.72 (L) 10/29/2019     10/29/2019    K 4.2 10/29/2019     10/29/2019    CO2 28.0 10/29/2019     Lab Results   Component Value Date    HGBA1C 11.50 (H) 10/29/2019     Lab Results   Component Value Date    WBC 6.52 10/28/2019    HGB 14.2 10/28/2019    HCT 44.1 10/28/2019     10/28/2019       Assessment:  No diagnosis found.    Plan:    1. History of aortic coarctation repair as a child  -CT 2019 showed no evidence of abnormalities, no aneurysmal dilation  -Can repeat imaging roughly every 5 years, doing well currently     2. Hypertension  -Increase Coreg to 25 mg twice daily for better control given history of aortic coarctation repair  -Continue lisinopril     3. Hyperlipidemia  -cholesterol  triglycerides 425, HDL 29, LDL could not be calculated secondary to elevated triglycerides  -AST and ALT both mildly elevated on most recent labs  -on atorvastatin, Lovaza for triglycerides.  Will substitute Vascepa for Lovaza  -Discussed that improved control of diabetes will result in improvement of triglycerides  -     4. Type 2 diabetes  A1c 11.9  -on trulicity for CV risk reduction  -follows with Endocrine     5. SHERMAN  -Has CPAP     6.  Dyspnea on exertion s/p Covid  -resolved    Follow-up in about 6 months    Genaro Shultz MD [facility]

## 2022-04-18 ENCOUNTER — PRIOR AUTHORIZATION (OUTPATIENT)
Dept: CARDIOLOGY | Facility: CLINIC | Age: 57
End: 2022-04-18

## 2022-06-30 RX ORDER — LISINOPRIL 20 MG/1
20 TABLET ORAL DAILY
Qty: 30 TABLET | Refills: 0 | Status: SHIPPED | OUTPATIENT
Start: 2022-06-30 | End: 2022-08-17 | Stop reason: SDUPTHER

## 2022-08-03 RX ORDER — CARVEDILOL 25 MG/1
25 TABLET ORAL 2 TIMES DAILY WITH MEALS
Qty: 180 TABLET | Refills: 3 | Status: SHIPPED | OUTPATIENT
Start: 2022-08-03

## 2022-08-17 RX ORDER — LISINOPRIL 20 MG/1
20 TABLET ORAL DAILY
Qty: 90 TABLET | Refills: 3 | Status: SHIPPED | OUTPATIENT
Start: 2022-08-17

## 2023-12-20 ENCOUNTER — OFFICE VISIT (OUTPATIENT)
Dept: CARDIOLOGY | Facility: CLINIC | Age: 58
End: 2023-12-20
Payer: COMMERCIAL

## 2023-12-20 VITALS
OXYGEN SATURATION: 95 % | DIASTOLIC BLOOD PRESSURE: 80 MMHG | SYSTOLIC BLOOD PRESSURE: 150 MMHG | WEIGHT: 315 LBS | HEART RATE: 96 BPM | HEIGHT: 72 IN | BODY MASS INDEX: 42.66 KG/M2

## 2023-12-20 DIAGNOSIS — I10 ACCELERATED HYPERTENSION: Primary | ICD-10-CM

## 2023-12-20 DIAGNOSIS — Q24.9 CONGENITAL HEART DISEASE: ICD-10-CM

## 2023-12-20 DIAGNOSIS — E78.2 MIXED HYPERLIPIDEMIA: ICD-10-CM

## 2023-12-20 DIAGNOSIS — E66.01 OBESITY, CLASS III, BMI 40-49.9 (MORBID OBESITY): ICD-10-CM

## 2023-12-20 PROBLEM — E66.813 OBESITY, CLASS III, BMI 40-49.9 (MORBID OBESITY): Status: ACTIVE | Noted: 2021-03-29

## 2023-12-20 RX ORDER — MULTIVIT WITH MINERALS/LUTEIN
250 TABLET ORAL DAILY
COMMUNITY

## 2023-12-20 RX ORDER — CHLORAL HYDRATE 500 MG
1000 CAPSULE ORAL
COMMUNITY

## 2023-12-20 RX ORDER — AMLODIPINE BESYLATE 2.5 MG/1
2.5 TABLET ORAL NIGHTLY
Qty: 30 TABLET | Refills: 11 | Status: SHIPPED | OUTPATIENT
Start: 2023-12-20

## 2023-12-20 RX ORDER — ZINC GLUCONATE 50 MG
TABLET ORAL
COMMUNITY

## 2023-12-20 RX ORDER — NYSTATIN 100000 U/G
1 CREAM TOPICAL AS NEEDED
COMMUNITY
Start: 2023-09-21

## 2023-12-20 RX ORDER — OMEGA-3S/DHA/EPA/FISH OIL/D3 300MG-1000
400 CAPSULE ORAL DAILY
COMMUNITY

## 2023-12-20 NOTE — PROGRESS NOTES
Subjective:     Encounter Date:12/20/2023      Patient ID: Felix Kaur is a 58 y.o.   white male from [place], [place], works at [employer].     PCP: Krystina Marcus MD  REFERRING PHYSICIAN: Genaro Shultz MD.    Chief Complaint:   Chief Complaint   Patient presents with    Hypertension    Shortness of Breath     Dyspnea on exertion  Accelerated hypertension           Problem List:  Chest pain  [facility] ER visit 10/28/19, Negative Markers  CTA 10/28/19, No PE, No other significant findings. No Aortic Aneuyrsm  Negative GXT MPS Normal EF 10/29/2019  Echocardiogram 10/29/2019: Normal EF 70%, Aortic sclerosis, no stenosis  Hypertension  Hyperlipidemia; on statin therapy  Type 2 Diabetes Mellitus; subjectively 8.6% June 2023-data deficit  ETOH Abuse  Class III obesity: BMI 44.05  Obstructive Sleep Apnea (CPAP)  Congenital Heart Disease  Coarctation of Aorta Repaired  1978  Redo Repair Dr. Rogers [facility] 1982  Plans for CTA chest every 5 years  COVID-19 infection September 2020, March 2023    Allergies   Allergen Reactions    Penicillins Unknown (See Comments)     Childhood reaction       Current Outpatient Medications   Medication Instructions    amLODIPine (NORVASC) 2.5 mg, Oral, Nightly    aspirin 81 mg, Oral, Daily    atorvastatin (LIPITOR) 80 mg, Oral, Nightly    BASAGLAR KWIKPEN 26 Units, Subcutaneous, Nightly    BD Pen Needle Monica 2nd Gen 32G X 4 MM misc See Admin Instructions    Blood Glucose Monitoring Suppl (ONE TOUCH ULTRA 2) w/Device kit See Admin Instructions, for testing    carvedilol (COREG) 25 MG tablet TAKE ONE TABLET BY MOUTH TWICE A DAY WITH A MEAL    cholecalciferol (VITAMIN D3) 400 Units, Oral, Daily    coenzyme Q10 100 mg, Oral, Daily    fish oil 1,000 mg, Oral, Daily With Breakfast    fluticasone (FLONASE) 50 MCG/ACT nasal spray 2 sprays, Nasal, Daily    glucose blood test strip Accu-Chek Clarisa Plus Meter   use as directed E11.65    glucose blood test strip  Accu-Chek Clarisa Plus Meter   use as directed E11.65    glucose blood test strip OneTouch Ultra Blue Test Strip   Check blood sugar daily (E11.65)    glyBURIDE-metFORMIN (GLUCOVANCE) 2.5-500 MG per tablet 2 tablets, Oral, 2 Times Daily With Meals    insulin aspart prot & aspart (NovoLOG Mix 70/30 FlexPen) (70-30) 100 UNIT/ML suspension pen-injector injection Every 12 Hours Scheduled    Insulin Lispro Prot & Lispro (humaLOG 75-25) (75-25) 100 UNIT/ML suspension pen-injector pen ADMINISTER 20 UNITS UNDER THE SKIN TWICE DAILY BEFORE MEALS    Levemir FlexTouch 100 UNIT/ML injection 30 Units 2 (Two) Times a Day.    lisinopril (PRINIVIL,ZESTRIL) 40 mg, Oral, Daily, *NEEDS UPDATED LAB WORK FOR FUTURE REFILLS*    Multiple Vitamins-Minerals (b complex-C-E-zinc) tablet 1 tablet, Oral, Daily    Multiple Vitamins-Minerals (MULTIVITAMIN PO) 1 tablet, Oral, Daily    nystatin (MYCOSTATIN) 895333 UNIT/GM cream 1 Application, Topical, As Needed    Ozempic, 0.25 or 0.5 MG/DOSE, 2 MG/1.5ML solution pen-injector INJECT 0.5MG SUBCUTANEOUSLY ONE DAY A WEEK    pantoprazole (PROTONIX) 40 mg, Oral, Daily    Probiotic Product (PROBIOTIC PO) 1 capsule, Oral, Daily    tamsulosin (FLOMAX) 0.4 MG capsule 24 hr capsule 1 capsule, Oral, Daily    vitamin C (ASCORBIC ACID) 250 mg, Oral, Daily    Zinc 50 MG tablet Oral         HISTORY OF PRESENT ILLNESS:  The patient is here after a 20-month hiatus.  The patient says that he is not overly active other than doing some walking.  He did climb 4 flights of stairs to get to his appointment today.  He says when he saw his primary care physician a few months ago that his blood pressure was 135 systolic.  He just received a blood pressure monitor but has not yet used it.  He will start monitoring his blood pressure and call back in 1 week with readings.  He denies any hospitalizations or surgeries since his last appointment.  He had COVID with mild symptoms in March 2023.  The patient had laboratory testing  "with his PCP in June 2023 and will send the results of this to me for review.  He says his last hemoglobin A1c was 8.6%.  He said that he had made dietary modifications and at one point his A1c was down to 6.8% but he has a sweet tooth.  He denies any chest pain, shortness of breath, palpitations, dizziness, presyncope, or syncope.  He is compliant with CPAP nightly.  The patient says that a few months ago his lisinopril was increased to 40 mg daily.      ROS   All other systems reviewed and otherwise negative.      ECG 12 Lead    Date/Time: 12/20/2023 10:00 AM  Performed by: Flora Xiao APRN    Authorized by: Flora Xiao APRN  Rhythm comments: Normal sinus rhythm, LAFB, abnormal ECG, 94 bpm,  ms,  ms, QTc 470 ms, no significant changes from last ECG in January 2020             Objective:       Vitals:    12/20/23 0917 12/20/23 0929 12/20/23 0955   BP: (!) 173/101 (!) 170/101 150/80   BP Location: Right arm Right arm Right arm   Patient Position: Standing Sitting Sitting   Pulse: 98 96    SpO2: 95%     Weight: (!) 147 kg (324 lb 12.8 oz)     Height: 182.9 cm (72\")       Body mass index is 44.05 kg/m².    Constitutional:       Appearance: Healthy appearance. Not in distress.   Neck:      Vascular: No JVR. JVD normal.   Pulmonary:      Effort: Pulmonary effort is normal.      Breath sounds: Normal breath sounds. No wheezing. No rhonchi. No rales.   Chest:      Chest wall: Not tender to palpatation.   Cardiovascular:      PMI at left midclavicular line. Normal rate. Regular rhythm. Normal S1. Normal S2.       Murmurs: There is a grade 2/6 systolic murmur at the URSB.      No gallop.  No click. No rub.   Pulses:     Intact distal pulses.   Edema:     Peripheral edema absent.   Abdominal:      General: Bowel sounds are normal.      Palpations: Abdomen is soft.      Tenderness: There is no abdominal tenderness.   Musculoskeletal: Normal range of motion.         General: No tenderness. Skin:     " General: Skin is warm and dry.   Neurological:      General: No focal deficit present.      Mental Status: Alert and oriented to person, place and time.           Lab Review:   Lab Results   Component Value Date    GLUCOSE 270 (H) 10/29/2019    BUN 12 10/29/2019    CREATININE 0.72 (L) 10/29/2019    EGFRIFNONA 114 10/29/2019    BCR 16.7 10/29/2019    CO2 28.0 10/29/2019    CALCIUM 9.1 10/29/2019    ALBUMIN 4.60 10/28/2019    AST 57 (H) 10/28/2019    ALT 81 (H) 10/28/2019       Lab Results   Component Value Date    WBC 6.52 10/28/2019    HGB 14.2 10/28/2019    HCT 44.1 10/28/2019    MCV 89.8 10/28/2019     10/28/2019       Lab Results   Component Value Date    HGBA1C 11.50 (H) 10/29/2019       Lab Results   Component Value Date    TSH 1.750 10/29/2019       Lab Results   Component Value Date    CHOL 198 10/29/2019     Lab Results   Component Value Date    TRIG 428 (H) 10/29/2019     Lab Results   Component Value Date    HDL 32 (L) 10/29/2019     Lab Results   Component Value Date    LDL  10/29/2019      Comment:      Unable to calculate     (H) 10/29/2019     Advance Care Planning   ACP discussion was held with the patient during this visit. Patient does not have an advance directive, declines further assistance.          Assessment:    Patient with uncontrolled hypertension so I will add amlodipine 2.5 mg nightly.  Patient will monitor his blood pressure and heart rate for 1 week and call back with readings.  He also has congenital heart disease with coarct repair and does not have a recent echocardiogram so I will order an echo to assess heart structure/function.  The patient will send me his last laboratory testing results from June 2023.     Diagnosis Plan   1. Accelerated hypertension  Add amlodipine 2.5 mg nightly, patient will monitor his blood pressure and heart rate for 1 week and call back with readings; may need to uptitrate amlodipine at that time.      2. Congenital heart disease   Echocardiogram      3. Mixed hyperlipidemia  No new labs to review, continue atorvastatin      4. Obesity, Class III, BMI 40-49.9 (morbid obesity)  Physical activity as tolerated, heart healthy diet             Plan:         Patient to continue current medications and close follow up with the above providers.  Tentative cardiology follow up in March 2024 in the HTN clinic or patient may return sooner PRN.  Echocardiogram  Patient to monitor blood pressure and heart rate for 1 week and call back with readings  Add amlodipine 2.5 mg nightly  Patient will send his last laboratory testing results to me for review      Electronically signed by NICOLLE Ramirez, 12/20/23, 10:02 AM EST.

## 2024-01-12 ENCOUNTER — TELEPHONE (OUTPATIENT)
Dept: CARDIOLOGY | Facility: CLINIC | Age: 59
End: 2024-01-12
Payer: COMMERCIAL

## 2024-01-12 NOTE — TELEPHONE ENCOUNTER
Spoke with PT with recommendations per NICOLLE Jackson:    Increase amlodipine to 5 mg nightly, take blood pressure 1 hour after he takes his morning medications and call back on Monday with his readings from over the weekend.     Pt was agreeable and verbalized understanding

## 2024-01-12 NOTE — TELEPHONE ENCOUNTER
Pt called with recent BP readings and he states these readings are done right after he takes his morning medicateions.  He will start recording his HR.  PT is concerned with elevated BP and states it use to be in the 130s and 140s systolic and in 80s and 90s diastolic.  Advised PT I would call him back with any recommendations made by NICOLLE Jackson  Bp readings are as follows:    1/5/24 -178/107  1/6//90  1/7//97  1/8//94  1/9/24- 155/85  1/10/24- 181/107  1/11/24- 179/102  1/12/24- 172/94 P-84    Please Advise

## 2024-01-15 ENCOUNTER — TELEPHONE (OUTPATIENT)
Dept: CARDIOLOGY | Facility: CLINIC | Age: 59
End: 2024-01-15
Payer: COMMERCIAL

## 2024-01-15 NOTE — TELEPHONE ENCOUNTER
Pt called with recent BP reading from the weekend  and they are as follows:    1/13/24 149/90 P-79  1/14/24 152/89 P-89  1/15/24 168/91 P 86    Pt states these readings are done 1.5 hours after his morning meds and these were taken after the increase in amlodipine.    Please advise

## 2024-01-16 RX ORDER — AMLODIPINE BESYLATE 10 MG/1
10 TABLET ORAL DAILY
Qty: 90 TABLET | Refills: 3 | Status: SHIPPED | OUTPATIENT
Start: 2024-01-16

## 2024-01-16 NOTE — TELEPHONE ENCOUNTER
Spoke with PT about recommendations per NICOLLE Jackson    Increase amlodipine to 10mg daily and call back in 1 week with bp and HR readings.     Prescription was sent to pharmacy   PT was agreeable and verbalized understanding

## 2024-01-30 ENCOUNTER — TELEPHONE (OUTPATIENT)
Dept: CARDIOLOGY | Facility: CLINIC | Age: 59
End: 2024-01-30
Payer: COMMERCIAL

## 2024-01-30 NOTE — TELEPHONE ENCOUNTER
PT called with recent BP and HR readings.  They were taken about an hour after medication    1/21/24 152/90 p81  1/22/24 152/92 p 75  1/23/24 142/88 p 82  1/24/24 135/84 p 80  1/25/24 128/86 p 91  1/26/24 136/83 p 78  1/27/24 144/85 p 80  1/28/24 133/81 p 81    PT denies any cardiac issues, please advise of changes or recommendations

## 2024-01-31 NOTE — TELEPHONE ENCOUNTER
"Called PT with recommendations per NICOLLE Jackson     \"These blood pressure readings look better with the increased dose of amlodipine. Would continue this dose and other cardiac medications. If bp consistently staying over 140 mmHg, please let me know\"    Pt states he will call back next week with more readings.    PT was agreeable and verbalized understanding   "

## 2024-02-08 ENCOUNTER — TELEPHONE (OUTPATIENT)
Dept: CARDIOLOGY | Facility: CLINIC | Age: 59
End: 2024-02-08
Payer: COMMERCIAL

## 2024-02-08 NOTE — TELEPHONE ENCOUNTER
Caller: Felix Kaur    Relationship: Self    Best call back number: [phone]    What is the best time to reach you: ANY     Who are you requesting to speak with (clinical staff, provider,  specific staff member): CLINICAL    What was the call regarding: PT IS CALLING TO GIVE BP READINGS.     2-2-24:  132/79 PULSE 81  133/80 PULSE 80     2-3-24:  138/84 PULSE 88  127/75 PULSE 83    2-4-24:  135/83 PULSE 86   123/72 PULSE 82    2-5-24:  121/73 PULSE 92

## 2024-02-08 NOTE — TELEPHONE ENCOUNTER
Called pt in regards to recent BP readings per NICOLLE Jackson      I am happy with these blood pressure readings.  Would continue current cardiac medications.     PT was agreeable and verbalized understanding

## 2024-08-20 RX ORDER — CARVEDILOL 25 MG/1
25 TABLET ORAL 2 TIMES DAILY WITH MEALS
Qty: 180 TABLET | Refills: 0 | Status: SHIPPED | OUTPATIENT
Start: 2024-08-20

## 2024-10-30 RX ORDER — CARVEDILOL 25 MG/1
25 TABLET ORAL 2 TIMES DAILY WITH MEALS
Qty: 180 TABLET | Refills: 0 | Status: SHIPPED | OUTPATIENT
Start: 2024-10-30

## 2025-01-10 RX ORDER — AMLODIPINE BESYLATE 10 MG/1
10 TABLET ORAL DAILY
Qty: 90 TABLET | Refills: 0 | Status: SHIPPED | OUTPATIENT
Start: 2025-01-10

## 2025-01-15 RX ORDER — AMLODIPINE BESYLATE 10 MG/1
10 TABLET ORAL DAILY
Qty: 90 TABLET | Refills: 0 | Status: SHIPPED | OUTPATIENT
Start: 2025-01-15

## 2025-01-27 RX ORDER — CARVEDILOL 25 MG/1
25 TABLET ORAL 2 TIMES DAILY WITH MEALS
Qty: 60 TABLET | Refills: 0 | Status: SHIPPED | OUTPATIENT
Start: 2025-01-27

## 2025-02-28 RX ORDER — CARVEDILOL 25 MG/1
25 TABLET ORAL 2 TIMES DAILY WITH MEALS
Qty: 60 TABLET | Refills: 0 | OUTPATIENT
Start: 2025-02-28

## 2025-03-03 RX ORDER — CARVEDILOL 25 MG/1
25 TABLET ORAL 2 TIMES DAILY WITH MEALS
Qty: 60 TABLET | Refills: 0 | OUTPATIENT
Start: 2025-03-03

## 2025-03-04 ENCOUNTER — TELEPHONE (OUTPATIENT)
Dept: CARDIOLOGY | Facility: CLINIC | Age: 60
End: 2025-03-04

## 2025-03-04 NOTE — TELEPHONE ENCOUNTER
Caller: Felix Kaur    Relationship to patient: Self    Best call back number: [phone]     Type of visit: F/U APPT    Requested date: MAY 5, 9, 19, OR 23.       Additional notes:PLEASE CONTACT PATIENT IN REGARDS TO THIS REQUEST.

## 2025-03-06 RX ORDER — CARVEDILOL 25 MG/1
25 TABLET ORAL 2 TIMES DAILY WITH MEALS
Qty: 60 TABLET | Refills: 1 | Status: SHIPPED | OUTPATIENT
Start: 2025-03-06

## 2025-04-17 RX ORDER — CARVEDILOL 25 MG/1
25 TABLET ORAL 2 TIMES DAILY WITH MEALS
Qty: 180 TABLET | Refills: 1 | Status: SHIPPED | OUTPATIENT
Start: 2025-04-17

## 2025-05-19 NOTE — PROGRESS NOTES
Subjective:     Encounter Date:05/23/2025      Patient ID: Felix Kaur is a 59 y.o.  white male from [place], [place], works at the [employer].     PCP: Krystina Marcus MD  REFERRING PHYSICIAN: Genaro Shultz MD.    Chief Complaint:   Chief Complaint   Patient presents with    Accelerated hypertension     Problem List:  Chest pain  [facility] ER visit 10/28/19, Negative Markers  CTA 10/28/19, No PE, No other significant findings. No Aortic Aneuyrsm  Negative GXT MPS Normal EF 10/29/2019  Echocardiogram 10/29/2019: Normal EF 70%, Aortic sclerosis, no stenosis  Hypertension  Hyperlipidemia; on statin therapy  Type 2 Diabetes Mellitus; subjectively 8.6% June 2023-data deficit  ETOH Abuse  Class III obesity: BMI 43.40  Obstructive Sleep Apnea (CPAP)  Congenital Heart Disease  Coarctation of Aorta Repaired  1978  Redo Repair [facility] 1982  Plans for CTA chest every 5 years  COVID-19 infection September 2020, March 2023    Allergies   Allergen Reactions    Penicillins Unknown (See Comments)     Childhood reaction       Current Outpatient Medications   Medication Instructions    amLODIPine (NORVASC) 10 mg, Oral, Daily    aspirin 81 mg, Daily    atorvastatin (LIPITOR) 80 mg, Oral, Nightly    BASAGLAR KWIKPEN 26 Units, Nightly    BD Pen Needle Monica 2nd Gen 32G X 4 MM misc See Admin Instructions    Blood Glucose Monitoring Suppl (ONE TOUCH ULTRA 2) w/Device kit See Admin Instructions    carvedilol (COREG) 25 mg, Oral, 2 Times Daily With Meals    cholecalciferol (VITAMIN D3) 400 Units, Daily    coenzyme Q10 100 mg, Daily    fish oil 1,000 mg, Daily With Breakfast    fluticasone (FLONASE) 50 MCG/ACT nasal spray 2 sprays, Daily    glucose blood test strip Accu-Chek Clarisa Plus Meter   use as directed E11.65    glucose blood test strip Accu-Chek Clarisa Plus Meter   use as directed E11.65    glucose blood test strip OneTouch Ultra Blue Test Strip   Check blood sugar daily (E11.65)    glyBURIDE-metFORMIN  (GLUCOVANCE) 2.5-500 MG per tablet 2 tablets, 2 Times Daily With Meals    insulin aspart prot & aspart (NovoLOG Mix 70/30 FlexPen) (70-30) 100 UNIT/ML suspension pen-injector injection Every 12 Hours Scheduled    Insulin Lispro Prot & Lispro (humaLOG 75-25) (75-25) 100 UNIT/ML suspension pen-injector pen ADMINISTER 20 UNITS UNDER THE SKIN TWICE DAILY BEFORE MEALS    Levemir FlexTouch 100 UNIT/ML injection 30 Units 2 (Two) Times a Day.    lisinopril (PRINIVIL,ZESTRIL) 20 mg, Oral, Daily, *NEEDS UPDATED LAB WORK FOR FUTURE REFILLS*    Multiple Vitamins-Minerals (b complex-C-E-zinc) tablet 1 tablet, Daily    Multiple Vitamins-Minerals (MULTIVITAMIN PO) 1 tablet, Daily    nystatin (MYCOSTATIN) 983153 UNIT/GM cream 1 Application, As Needed    Ozempic, 0.25 or 0.5 MG/DOSE, 2 MG/1.5ML solution pen-injector INJECT 0.5MG SUBCUTANEOUSLY ONE DAY A WEEK    pantoprazole (PROTONIX) 40 mg, Daily    Probiotic Product (PROBIOTIC PO) 1 capsule, Daily    tamsulosin (FLOMAX) 0.4 MG capsule 24 hr capsule 1 capsule, Daily    vitamin C (ASCORBIC ACID) 250 mg, Daily    Zinc 50 MG tablet Take  by mouth.         HISTORY OF PRESENT ILLNESS:  Patient is here after a 17-month hiatus.  After the patient's last appointment he was supposed to have an echocardiogram but this was never performed because the patient unexpectedly lost his job and did not have insurance.  He is now working at the [employer].  Blood pressure has been WNL at home.  He denies any chest pain, shortness of breath, palpitations, dizziness, presyncope, or syncope.  He just had labs a couple months ago and will have the results sent to me for review.  He walks for exercise.  He says he does quite a bit of walking at his job.      ROS   All other systems reviewed and otherwise negative.    Procedures       Objective:       Vitals:    05/23/25 1132 05/23/25 1135   BP: 130/68 132/70   BP Location: Right arm Right arm   Patient Position: Sitting Standing   Cuff Size: Adult Adult  "  Pulse:  77   SpO2: 93% 96%   Weight: (!) 145 kg (320 lb)    Height: 182.9 cm (72\")      Body mass index is 43.4 kg/m².  Wt Readings from Last 2 Encounters:   05/23/25 (!) 145 kg (320 lb)   12/20/23 (!) 147 kg (324 lb 12.8 oz)        Constitutional:       Appearance: Healthy appearance. Not in distress.   Neck:      Vascular: No JVR. JVD normal.   Pulmonary:      Effort: Pulmonary effort is normal.      Breath sounds: Normal breath sounds. No wheezing. No rhonchi. No rales.   Chest:      Chest wall: Not tender to palpatation.   Cardiovascular:      PMI at left midclavicular line. Normal rate. Regular rhythm. Normal S1. Normal S2.       Murmurs: There is a grade 2/6 systolic murmur at the URSB.      No gallop.  No click. No rub.   Pulses:     Intact distal pulses.   Edema:     Peripheral edema absent.   Abdominal:      General: Bowel sounds are normal.      Palpations: Abdomen is soft.      Tenderness: There is no abdominal tenderness.   Musculoskeletal: Normal range of motion.         General: No tenderness. Skin:     General: Skin is warm and dry.   Neurological:      General: No focal deficit present.      Mental Status: Alert and oriented to person, place and time.           Lab Review:   Lab Results   Component Value Date    GLUCOSE 270 (H) 10/29/2019    BUN 12 10/29/2019    CREATININE 0.72 (L) 10/29/2019    EGFRIFNONA 114 10/29/2019    BCR 16.7 10/29/2019    CO2 28.0 10/29/2019    CALCIUM 9.1 10/29/2019    ALBUMIN 4.60 10/28/2019    AST 57 (H) 10/28/2019    ALT 81 (H) 10/28/2019       Lab Results   Component Value Date    WBC 6.52 10/28/2019    HGB 14.2 10/28/2019    HCT 44.1 10/28/2019    MCV 89.8 10/28/2019     10/28/2019       Lab Results   Component Value Date    HGBA1C 11.50 (H) 10/29/2019       Lab Results   Component Value Date    TSH 1.750 10/29/2019       Lab Results   Component Value Date    CHOL 198 10/29/2019     Lab Results   Component Value Date    TRIG 428 (H) 10/29/2019     Lab Results "   Component Value Date    HDL 32 (L) 10/29/2019     Lab Results   Component Value Date    LDL  10/29/2019      Comment:      Unable to calculate     (H) 10/29/2019             Results for orders placed during the hospital encounter of 10/28/19    Adult Transthoracic Echo Complete W/ Cont if Necessary Per Protocol    Interpretation Summary  · Left ventricular systolic function is normal. Estimated EF appears to be in the range of 66 - 70%  · All left ventricular wall segments contract normally.  · There is aortic sclerosis without any hemodynamically significant stenosis  · Other cardiac valves are structurally and functionally normal            Advance Care Planning   ACP discussion was held with the patient during this visit. Patient has an advance directive (not in EMR), copy requested.      Assessment:     Overall continued acceptable course with no new interim cardiopulmonary complaints with good functional status. We will defer additional diagnostic or therapeutic intervention from a cardiac perspective at this time other than to reschedule echocardiogram. Hopefully I will get to review the patient's next laboratory testing results.       Diagnosis Plan   1. Congenital heart disease  Adult Transthoracic Echo Complete W/ Cont if Necessary Per Protocol      2. Accelerated hypertension  Controlled, continue current cardiac medications      3. Mixed hyperlipidemia  No new labs to review, continue atorvastatin             Plan:         Patient to continue current medications and close follow up with the above providers.  Tentative cardiology follow up in 1 year in the hypertension clinic or patient may return sooner PRN.  Reschedule echocardiogram    Electronically signed by NICOLLE Ramirez, 05/23/25, 11:55 AM EDT.

## 2025-05-23 ENCOUNTER — OFFICE VISIT (OUTPATIENT)
Dept: CARDIOLOGY | Facility: CLINIC | Age: 60
End: 2025-05-23
Payer: COMMERCIAL

## 2025-05-23 VITALS
HEIGHT: 72 IN | SYSTOLIC BLOOD PRESSURE: 132 MMHG | HEART RATE: 77 BPM | BODY MASS INDEX: 42.66 KG/M2 | DIASTOLIC BLOOD PRESSURE: 70 MMHG | OXYGEN SATURATION: 96 % | WEIGHT: 315 LBS

## 2025-05-23 DIAGNOSIS — E78.2 MIXED HYPERLIPIDEMIA: ICD-10-CM

## 2025-05-23 DIAGNOSIS — Q24.9 CONGENITAL HEART DISEASE: Primary | ICD-10-CM

## 2025-05-23 DIAGNOSIS — I10 ACCELERATED HYPERTENSION: ICD-10-CM

## 2025-05-23 PROCEDURE — 99214 OFFICE O/P EST MOD 30 MIN: CPT | Performed by: NURSE PRACTITIONER

## 2025-05-23 RX ORDER — AMLODIPINE BESYLATE 10 MG/1
10 TABLET ORAL DAILY
Qty: 90 TABLET | Refills: 3 | Status: SHIPPED | OUTPATIENT
Start: 2025-05-23

## 2025-05-27 ENCOUNTER — HOSPITAL ENCOUNTER (OUTPATIENT)
Facility: HOSPITAL | Age: 60
Discharge: HOME | End: 2025-05-27
Admitting: NURSE PRACTITIONER
Payer: COMMERCIAL

## 2025-05-27 VITALS — BODY MASS INDEX: 42.66 KG/M2 | WEIGHT: 315 LBS | HEIGHT: 72 IN

## 2025-05-27 LAB
AORTIC DIMENSIONLESS INDEX: 0.64 (DI)
ASCENDING AORTA: 3.6 CM
AV MEAN PRESS GRAD SYS DOP V1V2: 9.5 MMHG
AV VMAX SYS DOP: 214 CM/SEC
BH CV ECHO MEAS - AO MAX PG: 18.3 MMHG
BH CV ECHO MEAS - AO ROOT DIAM: 3.3 CM
BH CV ECHO MEAS - AO V2 VTI: 40.3 CM
BH CV ECHO MEAS - AVA(I,D): 2.01 CM2
BH CV ECHO MEAS - EDV(CUBED): 159.9 ML
BH CV ECHO MEAS - EDV(MOD-SP2): 127 ML
BH CV ECHO MEAS - EDV(MOD-SP4): 156 ML
BH CV ECHO MEAS - EF(MOD-SP2): 69.9 %
BH CV ECHO MEAS - EF(MOD-SP4): 63.7 %
BH CV ECHO MEAS - ESV(CUBED): 26.9 ML
BH CV ECHO MEAS - ESV(MOD-SP2): 38.2 ML
BH CV ECHO MEAS - ESV(MOD-SP4): 56.7 ML
BH CV ECHO MEAS - FS: 44.8 %
BH CV ECHO MEAS - IVS/LVPW: 1.19 CM
BH CV ECHO MEAS - IVSD: 1.17 CM
BH CV ECHO MEAS - LA DIMENSION: 4 CM
BH CV ECHO MEAS - LAT PEAK E' VEL: 10.3 CM/SEC
BH CV ECHO MEAS - LV DIASTOLIC VOL/BSA (35-75): 60.3 CM2
BH CV ECHO MEAS - LV MASS(C)D: 231.6 GRAMS
BH CV ECHO MEAS - LV MAX PG: 5.6 MMHG
BH CV ECHO MEAS - LV MEAN PG: 2.5 MMHG
BH CV ECHO MEAS - LV SYSTOLIC VOL/BSA (12-30): 21.9 CM2
BH CV ECHO MEAS - LV V1 MAX: 118.5 CM/SEC
BH CV ECHO MEAS - LV V1 VTI: 25.8 CM
BH CV ECHO MEAS - LVIDD: 5.4 CM
BH CV ECHO MEAS - LVIDS: 3 CM
BH CV ECHO MEAS - LVOT AREA: 3.1 CM2
BH CV ECHO MEAS - LVOT DIAM: 2 CM
BH CV ECHO MEAS - LVPWD: 0.99 CM
BH CV ECHO MEAS - MED PEAK E' VEL: 7.4 CM/SEC
BH CV ECHO MEAS - MV A MAX VEL: 165 CM/SEC
BH CV ECHO MEAS - MV DEC SLOPE: 1041 CM/SEC2
BH CV ECHO MEAS - MV DEC TIME: 0.17 SEC
BH CV ECHO MEAS - MV E MAX VEL: 153 CM/SEC
BH CV ECHO MEAS - MV E/A: 0.93
BH CV ECHO MEAS - MV MAX PG: 12.2 MMHG
BH CV ECHO MEAS - MV MEAN PG: 6 MMHG
BH CV ECHO MEAS - MV P1/2T: 50.4 MSEC
BH CV ECHO MEAS - MV V2 VTI: 36.5 CM
BH CV ECHO MEAS - MVA(P1/2T): 4.4 CM2
BH CV ECHO MEAS - MVA(VTI): 2.22 CM2
BH CV ECHO MEAS - PA ACC TIME: 0.09 SEC
BH CV ECHO MEAS - PA V2 MAX: 120 CM/SEC
BH CV ECHO MEAS - SV(LVOT): 80.9 ML
BH CV ECHO MEAS - SV(MOD-SP2): 88.8 ML
BH CV ECHO MEAS - SV(MOD-SP4): 99.3 ML
BH CV ECHO MEAS - SVI(LVOT): 31.2 ML/M2
BH CV ECHO MEAS - SVI(MOD-SP2): 34.3 ML/M2
BH CV ECHO MEAS - SVI(MOD-SP4): 38.4 ML/M2
BH CV ECHO MEAS - TAPSE (>1.6): 2.26 CM
BH CV ECHO MEASUREMENTS AVERAGE E/E' RATIO: 17.29
BH CV VAS BP LEFT ARM: NORMAL MMHG
BH CV XLRA - RV BASE: 3.2 CM
BH CV XLRA - RV LENGTH: 8 CM
BH CV XLRA - RV MID: 2.8 CM
BH CV XLRA - TDI S': 12 CM/SEC
IVRT: 76 MS
LEFT ATRIUM VOLUME INDEX: 32.7 ML/M2
LV EF BIPLANE MOD: 67 %

## 2025-05-27 PROCEDURE — 93306 TTE W/DOPPLER COMPLETE: CPT

## 2025-05-27 PROCEDURE — 25010000002 SULFUR HEXAFLUORIDE MICROSPH 60.7-25 MG RECONSTITUTED SUSPENSION: Performed by: NURSE PRACTITIONER

## 2025-05-27 PROCEDURE — 93306 TTE W/DOPPLER COMPLETE: CPT | Performed by: INTERNAL MEDICINE

## 2025-05-27 RX ADMIN — SULFUR HEXAFLUORIDE 2 ML: KIT at 09:08

## 2025-07-21 RX ORDER — CARVEDILOL 25 MG/1
25 TABLET ORAL 2 TIMES DAILY WITH MEALS
Qty: 180 TABLET | Refills: 1 | Status: SHIPPED | OUTPATIENT
Start: 2025-07-21